# Patient Record
Sex: MALE | Race: WHITE | Employment: OTHER | URBAN - METROPOLITAN AREA
[De-identification: names, ages, dates, MRNs, and addresses within clinical notes are randomized per-mention and may not be internally consistent; named-entity substitution may affect disease eponyms.]

---

## 2017-09-14 ENCOUNTER — OFFICE VISIT (OUTPATIENT)
Dept: CARDIOLOGY CLINIC | Age: 72
End: 2017-09-14

## 2017-09-14 VITALS
WEIGHT: 201 LBS | BODY MASS INDEX: 28.14 KG/M2 | HEIGHT: 71 IN | OXYGEN SATURATION: 92 % | DIASTOLIC BLOOD PRESSURE: 68 MMHG | SYSTOLIC BLOOD PRESSURE: 98 MMHG | HEART RATE: 62 BPM

## 2017-09-14 DIAGNOSIS — E78.2 MIXED HYPERLIPIDEMIA: Chronic | ICD-10-CM

## 2017-09-14 DIAGNOSIS — I25.10 CAD S/P PERCUTANEOUS CORONARY ANGIOPLASTY: ICD-10-CM

## 2017-09-14 DIAGNOSIS — Z86.718 HX OF BLOOD CLOTS: ICD-10-CM

## 2017-09-14 DIAGNOSIS — D68.59 HYPERCOAGULABLE STATE (HCC): ICD-10-CM

## 2017-09-14 DIAGNOSIS — Z98.61 CAD S/P PERCUTANEOUS CORONARY ANGIOPLASTY: ICD-10-CM

## 2017-09-14 DIAGNOSIS — I10 ESSENTIAL HYPERTENSION: Primary | Chronic | ICD-10-CM

## 2017-09-14 PROCEDURE — 99214 OFFICE O/P EST MOD 30 MIN: CPT | Performed by: INTERNAL MEDICINE

## 2017-09-14 RX ORDER — TAMSULOSIN HYDROCHLORIDE 0.4 MG/1
CAPSULE ORAL
COMMUNITY
Start: 2017-08-28

## 2017-09-14 RX ORDER — IMIPRAMINE HCL 25 MG
50 TABLET ORAL NIGHTLY
COMMUNITY
Start: 2017-09-08

## 2018-08-15 LAB
ALBUMIN SERPL-MCNC: 4.1 G/DL
ALP BLD-CCNC: 79 U/L
ALT SERPL-CCNC: 14 U/L
ANION GAP SERPL CALCULATED.3IONS-SCNC: NORMAL MMOL/L
AST SERPL-CCNC: 17 U/L
BILIRUB SERPL-MCNC: 0.8 MG/DL (ref 0.1–1.4)
BUN BLDV-MCNC: 16 MG/DL
CALCIUM SERPL-MCNC: 9.5 MG/DL
CHLORIDE BLD-SCNC: 97 MMOL/L
CHOLESTEROL, TOTAL: 106 MG/DL
CHOLESTEROL/HDL RATIO: NORMAL
CO2: 25 MMOL/L
CREAT SERPL-MCNC: 1.3 MG/DL
GFR CALCULATED: NORMAL
GLUCOSE BLD-MCNC: 93 MG/DL
HDLC SERPL-MCNC: 44 MG/DL (ref 35–70)
INR BLD: 1.9
LDL CHOLESTEROL CALCULATED: 49 MG/DL (ref 0–160)
POTASSIUM SERPL-SCNC: 4.7 MMOL/L
PROTIME: NORMAL SECONDS
SODIUM BLD-SCNC: 136 MMOL/L
TOTAL PROTEIN: 6.5
TRIGL SERPL-MCNC: 66 MG/DL
TSH SERPL DL<=0.05 MIU/L-ACNC: 2.56 UIU/ML
VLDLC SERPL CALC-MCNC: NORMAL MG/DL

## 2018-09-19 ENCOUNTER — OFFICE VISIT (OUTPATIENT)
Dept: CARDIOLOGY CLINIC | Age: 73
End: 2018-09-19

## 2018-09-19 VITALS
SYSTOLIC BLOOD PRESSURE: 106 MMHG | HEIGHT: 71 IN | DIASTOLIC BLOOD PRESSURE: 60 MMHG | HEART RATE: 70 BPM | WEIGHT: 198.6 LBS | BODY MASS INDEX: 27.8 KG/M2

## 2018-09-19 DIAGNOSIS — I10 ESSENTIAL HYPERTENSION: Chronic | ICD-10-CM

## 2018-09-19 DIAGNOSIS — D68.59 HYPERCOAGULABLE STATE (HCC): ICD-10-CM

## 2018-09-19 DIAGNOSIS — Z98.61 CAD S/P PERCUTANEOUS CORONARY ANGIOPLASTY: Primary | ICD-10-CM

## 2018-09-19 DIAGNOSIS — N28.9 RENAL INSUFFICIENCY: ICD-10-CM

## 2018-09-19 DIAGNOSIS — E78.2 MIXED HYPERLIPIDEMIA: Chronic | ICD-10-CM

## 2018-09-19 DIAGNOSIS — I25.10 CAD S/P PERCUTANEOUS CORONARY ANGIOPLASTY: Primary | ICD-10-CM

## 2018-09-19 DIAGNOSIS — I45.2 RBBB (RIGHT BUNDLE BRANCH BLOCK WITH LEFT ANTERIOR FASCICULAR BLOCK): ICD-10-CM

## 2018-09-19 PROCEDURE — 93000 ELECTROCARDIOGRAM COMPLETE: CPT | Performed by: INTERNAL MEDICINE

## 2018-09-19 PROCEDURE — 99215 OFFICE O/P EST HI 40 MIN: CPT | Performed by: INTERNAL MEDICINE

## 2018-09-19 RX ORDER — CITALOPRAM 40 MG/1
TABLET ORAL
COMMUNITY
Start: 2018-08-23

## 2018-09-19 NOTE — PROGRESS NOTES
percutaneous coronary angioplasty  Asymptomatic and doing well. We'll continue aggressive risk modification for secondary prevention. Patient is a offered to have a stress test evaluation as he had not had one since his coronary intervention in 2012 and the guidelines recommended to be done at least once in 5 years particularly he did not have any typical symptoms when he presented. He verbalizes understanding and would like to hold off on it for now and will contact us if he has any symptoms. Hypercoagulable state [facility])  Patient has been on the long-term warfarin therapy and follows up with PCP. Recent INR was 1.9. Primary/secondary prevention is the goal by aggressive risk modification, healthy and therapeutic life style changes for cardiovascular risk reduction. Various goals are discussed and questions answered. counseled to increase oral fluids and have a follow up with PCP for renal insufficiency. Appropriate prescriptions if needed on this visit are addressed. After visit summery is provided. Questions answered and patient verbalizes understanding. Follow up in office in 12 months with ECG, sooner if needed. Viraj Jaramillo MD, 9/19/2018 8:45 AM     Please note this report has been partially produced using speech recognition software and may contain errors related to that system including errors in grammar, punctuation, and spelling, as well as words and phrases that may be inappropriate. If there are any questions or concerns please feel free to contact the dictating provider for clarification.

## 2019-09-18 ENCOUNTER — OFFICE VISIT (OUTPATIENT)
Dept: CARDIOLOGY CLINIC | Age: 74
End: 2019-09-18
Payer: COMMERCIAL

## 2019-09-18 VITALS
HEART RATE: 59 BPM | DIASTOLIC BLOOD PRESSURE: 70 MMHG | SYSTOLIC BLOOD PRESSURE: 120 MMHG | HEIGHT: 71 IN | BODY MASS INDEX: 26.4 KG/M2 | WEIGHT: 188.6 LBS

## 2019-09-18 DIAGNOSIS — I25.10 CAD S/P PERCUTANEOUS CORONARY ANGIOPLASTY: ICD-10-CM

## 2019-09-18 DIAGNOSIS — Z98.61 CAD S/P PERCUTANEOUS CORONARY ANGIOPLASTY: ICD-10-CM

## 2019-09-18 DIAGNOSIS — D68.59 HYPERCOAGULABLE STATE (HCC): Primary | ICD-10-CM

## 2019-09-18 DIAGNOSIS — I10 ESSENTIAL HYPERTENSION: Chronic | ICD-10-CM

## 2019-09-18 DIAGNOSIS — E78.2 MIXED HYPERLIPIDEMIA: Chronic | ICD-10-CM

## 2019-09-18 PROCEDURE — 99214 OFFICE O/P EST MOD 30 MIN: CPT | Performed by: INTERNAL MEDICINE

## 2019-09-18 PROCEDURE — 93000 ELECTROCARDIOGRAM COMPLETE: CPT | Performed by: INTERNAL MEDICINE

## 2019-09-18 RX ORDER — ACETAMINOPHEN 500 MG
500 TABLET ORAL EVERY 6 HOURS PRN
COMMUNITY

## 2020-01-02 PROCEDURE — 99999 PR OFFICE/OUTPT VISIT,PROCEDURE ONLY: CPT | Performed by: INTERNAL MEDICINE

## 2020-01-04 ENCOUNTER — APPOINTMENT (OUTPATIENT)
Dept: CT IMAGING | Age: 75
End: 2020-01-04
Payer: COMMERCIAL

## 2020-01-04 ENCOUNTER — APPOINTMENT (OUTPATIENT)
Dept: GENERAL RADIOLOGY | Age: 75
End: 2020-01-04
Payer: COMMERCIAL

## 2020-01-04 ENCOUNTER — HOSPITAL ENCOUNTER (OUTPATIENT)
Age: 75
Setting detail: OBSERVATION
Discharge: HOME | End: 2020-01-06
Attending: EMERGENCY MEDICINE | Admitting: INTERNAL MEDICINE
Payer: COMMERCIAL

## 2020-01-04 PROBLEM — S32.010A COMPRESSION FRACTURE OF L1 LUMBAR VERTEBRA (HCC): Status: ACTIVE | Noted: 2020-01-04
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
## 2020-01-04 PROBLEM — M54.50 ACUTE BILATERAL LOW BACK PAIN: Status: ACTIVE | Noted: 2020-01-04
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
## 2020-01-04 PROBLEM — I26.99 BILATERAL PULMONARY EMBOLISM (HCC): Status: ACTIVE | Noted: 2020-01-04

## 2020-01-04 LAB
ALBUMIN SERPL-MCNC: 4.2 GM/DL (ref 3.4–5)
ALP BLD-CCNC: 91 IU/L (ref 40–129)
ALT SERPL-CCNC: 13 U/L (ref 10–40)
ANION GAP SERPL CALCULATED.3IONS-SCNC: 13 MMOL/L (ref 4–16)
AST SERPL-CCNC: 18 IU/L (ref 15–37)
BASOPHILS ABSOLUTE: 0.1 K/CU MM
BASOPHILS RELATIVE PERCENT: 1 % (ref 0–1)
BILIRUB SERPL-MCNC: 0.8 MG/DL (ref 0–1)
BUN BLDV-MCNC: 11 MG/DL (ref 6–23)
CALCIUM SERPL-MCNC: 9 MG/DL (ref 8.3–10.6)
CHLORIDE BLD-SCNC: 99 MMOL/L (ref 99–110)
CO2: 25 MMOL/L (ref 21–32)
CREAT SERPL-MCNC: 1.1 MG/DL (ref 0.9–1.3)
DIFFERENTIAL TYPE: ABNORMAL
EOSINOPHILS ABSOLUTE: 0.1 K/CU MM
EOSINOPHILS RELATIVE PERCENT: 1.5 % (ref 0–3)
GFR AFRICAN AMERICAN: >60 ML/MIN/1.73M2
GFR NON-AFRICAN AMERICAN: >60 ML/MIN/1.73M2
GLUCOSE BLD-MCNC: 100 MG/DL (ref 70–99)
HCT VFR BLD CALC: 46.7 % (ref 42–52)
HEMOGLOBIN: 15 GM/DL (ref 13.5–18)
IMMATURE NEUTROPHIL %: 0.2 % (ref 0–0.43)
INR BLD: 2.63 INDEX
LYMPHOCYTES ABSOLUTE: 1.3 K/CU MM
LYMPHOCYTES RELATIVE PERCENT: 16.4 % (ref 24–44)
MCH RBC QN AUTO: 28.5 PG (ref 27–31)
MCHC RBC AUTO-ENTMCNC: 32.1 % (ref 32–36)
MCV RBC AUTO: 88.6 FL (ref 78–100)
MONOCYTES ABSOLUTE: 0.8 K/CU MM
MONOCYTES RELATIVE PERCENT: 9.8 % (ref 0–4)
NUCLEATED RBC %: 0 %
PDW BLD-RTO: 14.6 % (ref 11.7–14.9)
PLATELET # BLD: 241 K/CU MM (ref 140–440)
PMV BLD AUTO: 10 FL (ref 7.5–11.1)
POTASSIUM SERPL-SCNC: 4.6 MMOL/L (ref 3.5–5.1)
PRO-BNP: 3383 PG/ML
PROTHROMBIN TIME: 32.1 SECONDS (ref 11.7–14.5)
RBC # BLD: 5.27 M/CU MM (ref 4.6–6.2)
SEGMENTED NEUTROPHILS ABSOLUTE COUNT: 5.8 K/CU MM
SEGMENTED NEUTROPHILS RELATIVE PERCENT: 71.1 % (ref 36–66)
SODIUM BLD-SCNC: 137 MMOL/L (ref 135–145)
TOTAL IMMATURE NEUTOROPHIL: 0.02 K/CU MM
TOTAL NUCLEATED RBC: 0 K/CU MM
TOTAL PROTEIN: 7.3 GM/DL (ref 6.4–8.2)
TROPONIN T: <0.01 NG/ML
WBC # BLD: 8.2 K/CU MM (ref 4–10.5)

## 2020-01-04 PROCEDURE — 72100 X-RAY EXAM L-S SPINE 2/3 VWS: CPT

## 2020-01-04 PROCEDURE — 6370000000 HC RX 637 (ALT 250 FOR IP): Performed by: EMERGENCY MEDICINE

## 2020-01-04 PROCEDURE — 71275 CT ANGIOGRAPHY CHEST: CPT

## 2020-01-04 PROCEDURE — 6370000000 HC RX 637 (ALT 250 FOR IP): Performed by: NURSE PRACTITIONER

## 2020-01-04 PROCEDURE — 2580000003 HC RX 258: Performed by: NURSE PRACTITIONER

## 2020-01-04 PROCEDURE — 72072 X-RAY EXAM THORAC SPINE 3VWS: CPT

## 2020-01-04 PROCEDURE — 6360000004 HC RX CONTRAST MEDICATION: Performed by: EMERGENCY MEDICINE

## 2020-01-04 PROCEDURE — 1200000000 HC SEMI PRIVATE

## 2020-01-04 PROCEDURE — 85610 PROTHROMBIN TIME: CPT

## 2020-01-04 PROCEDURE — 71045 X-RAY EXAM CHEST 1 VIEW: CPT

## 2020-01-04 PROCEDURE — G0378 HOSPITAL OBSERVATION PER HR: HCPCS

## 2020-01-04 PROCEDURE — 84484 ASSAY OF TROPONIN QUANT: CPT

## 2020-01-04 PROCEDURE — 83880 ASSAY OF NATRIURETIC PEPTIDE: CPT

## 2020-01-04 PROCEDURE — 96372 THER/PROPH/DIAG INJ SC/IM: CPT

## 2020-01-04 PROCEDURE — 36415 COLL VENOUS BLD VENIPUNCTURE: CPT

## 2020-01-04 PROCEDURE — 80053 COMPREHEN METABOLIC PANEL: CPT

## 2020-01-04 PROCEDURE — 85025 COMPLETE CBC W/AUTO DIFF WBC: CPT

## 2020-01-04 PROCEDURE — 96375 TX/PRO/DX INJ NEW DRUG ADDON: CPT

## 2020-01-04 PROCEDURE — 96374 THER/PROPH/DIAG INJ IV PUSH: CPT

## 2020-01-04 PROCEDURE — 2580000003 HC RX 258: Performed by: EMERGENCY MEDICINE

## 2020-01-04 PROCEDURE — 99285 EMERGENCY DEPT VISIT HI MDM: CPT

## 2020-01-04 PROCEDURE — 93005 ELECTROCARDIOGRAM TRACING: CPT | Performed by: PHYSICIAN ASSISTANT

## 2020-01-04 PROCEDURE — 6360000002 HC RX W HCPCS: Performed by: EMERGENCY MEDICINE

## 2020-01-04 RX ORDER — CALCIUM CARBONATE 200(500)MG
750 TABLET,CHEWABLE ORAL DAILY
Status: DISCONTINUED | OUTPATIENT
Start: 2020-01-04 | End: 2020-01-06 | Stop reason: HOSPADM

## 2020-01-04 RX ORDER — NITROGLYCERIN 0.4 MG/1
0.4 TABLET SUBLINGUAL EVERY 5 MIN PRN
Status: DISCONTINUED | OUTPATIENT
Start: 2020-01-04 | End: 2020-01-06 | Stop reason: HOSPADM

## 2020-01-04 RX ORDER — HYDROCODONE BITARTRATE AND ACETAMINOPHEN 5; 325 MG/1; MG/1
1 TABLET ORAL ONCE
Status: COMPLETED | OUTPATIENT
Start: 2020-01-04 | End: 2020-01-04

## 2020-01-04 RX ORDER — SODIUM CHLORIDE 0.9 % (FLUSH) 0.9 %
10 SYRINGE (ML) INJECTION EVERY 12 HOURS SCHEDULED
Status: DISCONTINUED | OUTPATIENT
Start: 2020-01-04 | End: 2020-01-06 | Stop reason: HOSPADM

## 2020-01-04 RX ORDER — SODIUM CHLORIDE 0.9 % (FLUSH) 0.9 %
10 SYRINGE (ML) INJECTION PRN
Status: DISCONTINUED | OUTPATIENT
Start: 2020-01-04 | End: 2020-01-06 | Stop reason: HOSPADM

## 2020-01-04 RX ORDER — LIDOCAINE 4 G/G
1 PATCH TOPICAL DAILY
Status: DISCONTINUED | OUTPATIENT
Start: 2020-01-04 | End: 2020-01-06 | Stop reason: HOSPADM

## 2020-01-04 RX ORDER — CARVEDILOL 12.5 MG/1
12.5 TABLET ORAL 2 TIMES DAILY WITH MEALS
Status: DISCONTINUED | OUTPATIENT
Start: 2020-01-04 | End: 2020-01-06 | Stop reason: HOSPADM

## 2020-01-04 RX ORDER — LEVOTHYROXINE SODIUM 112 UG/1
112 TABLET ORAL DAILY
Status: DISCONTINUED | OUTPATIENT
Start: 2020-01-05 | End: 2020-01-06 | Stop reason: HOSPADM

## 2020-01-04 RX ORDER — ATORVASTATIN CALCIUM 40 MG/1
80 TABLET, FILM COATED ORAL DAILY
Status: DISCONTINUED | OUTPATIENT
Start: 2020-01-04 | End: 2020-01-06 | Stop reason: HOSPADM

## 2020-01-04 RX ORDER — ONDANSETRON 2 MG/ML
4 INJECTION INTRAMUSCULAR; INTRAVENOUS EVERY 6 HOURS PRN
Status: DISCONTINUED | OUTPATIENT
Start: 2020-01-04 | End: 2020-01-06 | Stop reason: HOSPADM

## 2020-01-04 RX ORDER — PANTOPRAZOLE SODIUM 20 MG/1
20 TABLET, DELAYED RELEASE ORAL DAILY
Status: DISCONTINUED | OUTPATIENT
Start: 2020-01-05 | End: 2020-01-06 | Stop reason: HOSPADM

## 2020-01-04 RX ORDER — TRAMADOL HYDROCHLORIDE 50 MG/1
50 TABLET ORAL EVERY 6 HOURS PRN
Status: DISCONTINUED | OUTPATIENT
Start: 2020-01-04 | End: 2020-01-06 | Stop reason: HOSPADM

## 2020-01-04 RX ORDER — ALENDRONATE SODIUM 70 MG/1
70 TABLET ORAL
Status: DISCONTINUED | OUTPATIENT
Start: 2020-01-04 | End: 2020-01-04 | Stop reason: CLARIF

## 2020-01-04 RX ORDER — TAMSULOSIN HYDROCHLORIDE 0.4 MG/1
0.4 CAPSULE ORAL DAILY
Status: DISCONTINUED | OUTPATIENT
Start: 2020-01-04 | End: 2020-01-06 | Stop reason: HOSPADM

## 2020-01-04 RX ORDER — IMIPRAMINE HCL 25 MG
25 TABLET ORAL NIGHTLY
Status: DISCONTINUED | OUTPATIENT
Start: 2020-01-04 | End: 2020-01-06 | Stop reason: HOSPADM

## 2020-01-04 RX ORDER — ACETAMINOPHEN 500 MG
500 TABLET ORAL EVERY 6 HOURS PRN
Status: DISCONTINUED | OUTPATIENT
Start: 2020-01-04 | End: 2020-01-06 | Stop reason: HOSPADM

## 2020-01-04 RX ORDER — CITALOPRAM 40 MG/1
40 TABLET ORAL DAILY
Status: DISCONTINUED | OUTPATIENT
Start: 2020-01-05 | End: 2020-01-06 | Stop reason: HOSPADM

## 2020-01-04 RX ORDER — FENTANYL CITRATE 50 UG/ML
25 INJECTION, SOLUTION INTRAMUSCULAR; INTRAVENOUS ONCE
Status: COMPLETED | OUTPATIENT
Start: 2020-01-04 | End: 2020-01-04

## 2020-01-04 RX ORDER — ASPIRIN 81 MG/1
81 TABLET ORAL DAILY
Status: DISCONTINUED | OUTPATIENT
Start: 2020-01-04 | End: 2020-01-06 | Stop reason: HOSPADM

## 2020-01-04 RX ADMIN — TRAMADOL HYDROCHLORIDE 50 MG: 50 TABLET, FILM COATED ORAL at 22:17

## 2020-01-04 RX ADMIN — ATORVASTATIN CALCIUM 80 MG: 40 TABLET, FILM COATED ORAL at 22:17

## 2020-01-04 RX ADMIN — HYDROCODONE BITARTRATE AND ACETAMINOPHEN 1 TABLET: 5; 325 TABLET ORAL at 15:20

## 2020-01-04 RX ADMIN — ENOXAPARIN SODIUM 80 MG: 80 INJECTION SUBCUTANEOUS at 17:55

## 2020-01-04 RX ADMIN — SODIUM CHLORIDE, PRESERVATIVE FREE 10 ML: 5 INJECTION INTRAVENOUS at 22:18

## 2020-01-04 RX ADMIN — TAMSULOSIN HYDROCHLORIDE 0.4 MG: 0.4 CAPSULE ORAL at 22:17

## 2020-01-04 RX ADMIN — ASPIRIN 81 MG: 81 TABLET, COATED ORAL at 22:17

## 2020-01-04 RX ADMIN — CARVEDILOL 12.5 MG: 12.5 TABLET, FILM COATED ORAL at 22:17

## 2020-01-04 RX ADMIN — FENTANYL CITRATE 25 MCG: 50 INJECTION, SOLUTION INTRAMUSCULAR; INTRAVENOUS at 17:49

## 2020-01-04 RX ADMIN — IOPAMIDOL 75 ML: 755 INJECTION, SOLUTION INTRAVENOUS at 16:39

## 2020-01-04 RX ADMIN — Medication 10 ML: at 16:39

## 2020-01-04 RX ADMIN — CALCIUM CARBONATE 750 MG: 500 TABLET, CHEWABLE ORAL at 22:17

## 2020-01-04 RX ADMIN — IMIPRAMINE HYDROCHLORIDE 25 MG: 25 TABLET ORAL at 23:58

## 2020-01-04 ASSESSMENT — PAIN DESCRIPTION - FREQUENCY
FREQUENCY: CONTINUOUS
FREQUENCY: INTERMITTENT
FREQUENCY: CONTINUOUS
FREQUENCY: CONTINUOUS

## 2020-01-04 ASSESSMENT — PAIN SCALES - GENERAL
PAINLEVEL_OUTOF10: 6
PAINLEVEL_OUTOF10: 7
PAINLEVEL_OUTOF10: 6
PAINLEVEL_OUTOF10: 6
PAINLEVEL_OUTOF10: 8
PAINLEVEL_OUTOF10: 6
PAINLEVEL_OUTOF10: 7

## 2020-01-04 ASSESSMENT — PAIN DESCRIPTION - ORIENTATION
ORIENTATION: POSTERIOR
ORIENTATION: POSTERIOR;UPPER;MID;LOWER
ORIENTATION: UPPER;MID;LOWER

## 2020-01-04 ASSESSMENT — PAIN DESCRIPTION - LOCATION
LOCATION: BACK

## 2020-01-04 ASSESSMENT — PAIN DESCRIPTION - PROGRESSION
CLINICAL_PROGRESSION: NOT CHANGED
CLINICAL_PROGRESSION: NOT CHANGED

## 2020-01-04 ASSESSMENT — PAIN DESCRIPTION - ONSET: ONSET: ON-GOING

## 2020-01-04 ASSESSMENT — PAIN DESCRIPTION - DESCRIPTORS
DESCRIPTORS: SHARP
DESCRIPTORS: SPASM

## 2020-01-04 ASSESSMENT — PAIN DESCRIPTION - PAIN TYPE
TYPE: ACUTE PAIN

## 2020-01-04 NOTE — ED NOTES
Pt reports he has hx of HTN.  Pt /116  Wade Bell, RN  01/04/20 Edwina 132 Edin Lara RN  01/04/20 3725

## 2020-01-04 NOTE — H&P
wife at the time of admission in lay language who agree to the above plan and disposition of admission for further care. All concerns and questions addressed. Patient assessment and plan in conjunction with supervising physician - Dr. Kasie Mauricio Cardiac    DVT Prophylaxis [x] Lovenox, []  Heparin, [] SCDs, [] Ambulation  [] Long term AC   GI Prophylaxis [x] PPI,  [] H2 Blocker,  [] Carafate,  [] Diet,  [] No GI prophylaxis, N/A: patient is not under significant medical stress, non-ICU or is receiving a diet/tube feeds   Code Status  Full CODE   Disposition Patient requires continued admission due to Bilateral pulmonary embolism ([facility] [address] Discharge Plan: Patient plans to return home upon discharge. MDM [] Low, [x] Moderate,[]  High  Patient's risk as above due to:      [x] One or more chronic illnesses with mild exacerbation progression      [] Two or more stable chronic illnesses      [] Undiagnosed new problem with uncertain prognosis      [] Elective major surgery      []Prescription drug management     History of Present Illness:     Principal Problem: Bilateral pulmonary embolism ([facility] [address]  Lupe Chaudhry is a 66 y.o. male who was sent to the ED by Urgent care with wife for complaints of lower back pain and hypoxia. Patient has past medical history of PE on Coumadin, CAD, asthma, arthritis, GERD,, hyperlipidemia, hypertension, and thyroid disease. Patient reports that he was lifting some fence pieces to help his neighbor whose fence blew down earlier in the week and fell 2-3 days ago. Then, since Wednesday he has been having lower back pain. He ignored the pain and figured it was muscle strain. However, pain got worse today, and he went to urgent care for evaluation. But while at the center, he was told that he was hypoxic and sent to emergency hospital. Patient denies chest pain, palpitation, fever, shortness of breath, and cough. Patient reports colonoscopy screening 2 years ago.  Denies blood lateralizing weakness. PSYC  -Awake, alert, oriented x 4. Appropriate affect. Past Medical History:      Past Medical History:   Diagnosis Date    Anemia     Arthritis     CAD S/P percutaneous coronary angioplasty 8/13/2012    Anamalous RCA, Stent in Diagonal    GERD (gastroesophageal reflux disease)     [place] filter in place     H/O cardiac catheterization 8/9/12    H/O echocardiogram     3/13 Mild MR/TR, 7/12 mildly reduced LVS with inferolateral wall hypokinesis, mildly reduced LV EF 35%, mild lt ventricular dilatation, mod lt atrial enlargement, mild mitral regurg, mild tricuspid regurg    H/O exercise stress test 8/28/12    normal study. EF 48%, exercise 4.6 METS    History of complete ECG 8/22/12    Hx of blood clots     lt  arm    Hx of echocardiogram 07/01/2015    EF 45-50% Trace MR and TR, mild pulm. htn.  Hypercoagulable state ([address]     Hyperlipidemia     Hypertension     Pulmonary embolism ([address] 1980    RBBB (right bundle branch block with left anterior fascicular block)     Renal insufficiency 9/19/2018    Creatinine 1.3, 8/2018    Thyroid disease     Venous insufficiency      Past Surgery History:  Patient  has a past surgical history that includes Leg Tendon Surgery; tumor removal; Tonsillectomy; knee surgery; and Coronary angioplasty with stent (8/13/12). Social History:    FAM HX: Assessed: family history includes Cancer in his father; Heart Disease in his brother, maternal uncle, and mother; Stroke in his mother.   Soc HX:   Social History     Socioeconomic History    Marital status:      Spouse name: None    Number of children: None    Years of education: None    Highest education level: None   Occupational History    None   Social Needs    Financial resource strain: None    Food insecurity:     Worry: None     Inability: None    Transportation needs:     Medical: None     Non-medical: None   Tobacco Use    Smoking status: Former Smoker    Smokeless mouth daily  8/14/14   Historical Provider, MD   warfarin (COUMADIN) 5 MG tablet   Take 5 mg by mouth daily Pt takes 1 tablet on Mon., Wed., Friday and 1/2 tablet on other days 8/15/14   Historical Provider, MD   carvedilol (COREG) 12.5 MG tablet Take 12.5 mg by mouth 2 times daily (with meals). Historical Provider, MD   pantoprazole (PROTONIX) 40 MG tablet   Take 20 mg by mouth daily     Historical Provider, MD   aspirin 81 MG EC tablet Take 81 mg by mouth daily. Historical Provider, MD   levothyroxine (SYNTHROID) 112 MCG tablet Take 112 mcg by mouth daily. Historical Provider, MD   nitroGLYCERIN (NITROSTAT) 0.4 MG SL tablet Place 0.4 mg under the tongue every 5 minutes as needed. Historical Provider, MD     Data:     Laboratory this visit:  Reviewed  Recent Labs     01/04/20  1420   WBC 8.2   HGB 15.0   HCT 46.7         Recent Labs     01/04/20  1420      K 4.6   CL 99   CO2 25   BUN 11   CREATININE 1.1     Recent Labs     01/04/20  1420   AST 18   ALT 13   BILITOT 0.8   ALKPHOS 91     Recent Labs     01/04/20  1420   INR 2.63     No results for input(s): CKTOTAL, CKMB, CKMBINDEX in the last 72 hours. Invalid input(s): Vicky Barlow input(s): PRO-BNP    Radiology this visit:  Reviewed. Xr Thoracic Spine (3 Views)    Result Date: 1/4/2020  EXAMINATION: THREE XRAY VIEWS OF THE THORACIC SPINE 1/4/2020 3:42 pm COMPARISON: 07/16/2012 HISTORY: ORDERING SYSTEM PROVIDED HISTORY: trauma TECHNOLOGIST PROVIDED HISTORY: Reason for exam:->trauma Reason for Exam: back pain Acuity: Acute Type of Exam: Initial Additional signs and symptoms: did yard wrok yesterday,pain started today FINDINGS: Osteopenia. Lower thoracic spine and superior lumbar spine compression deformities have progressed compared to the lateral chest radiograph. No definite destructive bony abnormalities.      Osteopenia with progression of compression deformities compared to 2012 in the lower thoracic spine and superior lumbar spine. Although new since 2012 these are of uncertain age. A thoracic and lumbar spine MRI would be helpful for further evaluation     Xr Lumbar Spine (2-3 Views)    Result Date: 1/4/2020  EXAMINATION: THREE XRAY VIEWS OF THE LUMBAR SPINE 1/4/2020 3:42 pm COMPARISON: None. HISTORY: ORDERING SYSTEM PROVIDED HISTORY: pain TECHNOLOGIST PROVIDED HISTORY: Reason for exam:->pain Reason for Exam: back pain Acuity: Acute Type of Exam: Initial Additional signs and symptoms: did yard work yesterday,nki,pain started today FINDINGS: Osteopenia. IVC filter in place. Compression deformities of L2 and L1. Multilevel degenerative disc disease and facet joint arthropathy. Again seen are compression deformities of L1 and L2. A lumbar spine MRI would be helpful to evaluate for acuity     Xr Chest Portable    Result Date: 1/4/2020  EXAMINATION: ONE XRAY VIEW OF THE CHEST 1/4/2020 2:20 pm COMPARISON: July 1, 2015 HISTORY: ORDERING SYSTEM PROVIDED HISTORY: back pain TECHNOLOGIST PROVIDED HISTORY: Reason for exam:->back pain Reason for Exam: CHEST PAIN w/cough; back pain Acuity: Acute Type of Exam: Initial Additional signs and symptoms: pain since thursday FINDINGS: The cardiomediastinal silhouette is mildly enlarged. Bibasilar scarring and subsegmental atelectasis is noted. No evidence of acute infiltrate, pleural effusion, or pneumothorax. 1. Mild cardiomegaly without failure. 2. Bibasilar scarring and subsegmental atelectasis. Cta Pulmonary W Contrast    Result Date: 1/4/2020  EXAMINATION: CTA OF THE CHEST 1/4/2020 4:30 pm TECHNIQUE: CTA of the chest was performed after the administration of intravenous contrast.  Multiplanar reformatted images are provided for review. MIP images are provided for review. Dose modulation, iterative reconstruction, and/or weight based adjustment of the mA/kV was utilized to reduce the radiation dose to as low as reasonably achievable. COMPARISON: None.  HISTORY: ORDERING the lower lobes, right middle lobe, and lingula. Indeterminate 2 cm exophytic right renal lesion which may reflect a proteinaceous or hemorrhagic cyst but is incompletely evaluated on the current exam.  A nonemergent dedicated CT or MRI renal protocol recommended for complete characterization. Critical results were called by Dr. Jayce Cortes MD to Dr. Elizabeth Toledo on 1/4/2020 at 17:24. EKG this visit:   EKG: No available ECG to review during assessment/interview. Please see ED notes.     Current Treatment Team:  Treatment Team: Attending Provider: Lashawn Kent MD; Registered Nurse: ILENE Marin APRN-BC [facility]  1/4/2020 6:34 PM      Electronically signed by YANI Martins CNP on 1/4/2020 at 6:34 PM

## 2020-01-04 NOTE — ED PROVIDER NOTES
Triage Chief Complaint:   Back Pain (onset Wed between shoulder blades was doing heavy lifting so blammed it on that. pain increasing worse since/ sent to urgent care due to low pulse ox)      Torres Martinez:  Rox Gilliland is a 66 y.o. male that presents to the emergency department with a lateral back pain. States he was lifting some fence pieces to help the neighbor whose fence blew down earlier in the week so he ignored the pain and figured it was muscle strain. .  States the pain has gotten progressively worse, so he decided to be evaluated today. .  States it is worse with bending and twisting. Denies going into his chest or into his buttocks or down his legs. Patient states he went to urgent care to be seen and they felt he needed to be seen in the ED. He states that there pulse ox was stating his oxygen was in the low 80s however he states he does not feel short of breath does not have any pain in his chest.  He does not have any leg swelling. He is on Coumadin secondary to a history of blood clots in the past.  His oxygen was found to be 99% in triage. Patient states pain is a 7 out of 10 aching worse with bending and twisting. .    Past Medical History:   Diagnosis Date    Anemia     Arthritis     CAD S/P percutaneous coronary angioplasty 8/13/2012    Anamalous RCA, Stent in Diagonal    GERD (gastroesophageal reflux disease)     [place] filter in place     H/O cardiac catheterization 8/9/12    H/O echocardiogram     3/13 Mild MR/TR, 7/12 mildly reduced LVS with inferolateral wall hypokinesis, mildly reduced LV EF 35%, mild lt ventricular dilatation, mod lt atrial enlargement, mild mitral regurg, mild tricuspid regurg    H/O exercise stress test 8/28/12    normal study. EF 48%, exercise 4.6 METS    History of complete ECG 8/22/12    Hx of blood clots     lt  arm    Hx of echocardiogram 07/01/2015    EF 45-50% Trace MR and TR, mild pulm. htn.      Hypercoagulable state ([address]     Hyperlipidemia     Topics Concern    Not on file   Social History Narrative    Not on file     Current Facility-Administered Medications   Medication Dose Route Frequency Provider Last Rate Last Dose    lidocaine 4 % external patch 1 patch  1 patch Transdermal Daily Abby Perae MD   1 patch at 01/04/20 1518    sodium chloride flush 0.9 % injection 10 mL  10 mL Intravenous PRN Abby Perea MD   10 mL at 01/04/20 1639    fentaNYL (SUBLIMAZE) injection 25 mcg  25 mcg Intravenous Once Abby Perea MD         Current Outpatient Medications   Medication Sig Dispense Refill    acetaminophen (TYLENOL) 500 MG tablet Take 500 mg by mouth every 6 hours as needed for Pain      citalopram (CELEXA) 40 MG tablet       imipramine (TOFRANIL) 25 MG tablet       tamsulosin (FLOMAX) 0.4 MG capsule       alendronate (FOSAMAX) 70 MG tablet Take 70 mg by mouth every 7 days      calcium carbonate (TUMS EX) 750 MG chewable tablet Take 1 tablet by mouth daily      atorvastatin (LIPITOR) 80 MG tablet Take 80 mg by mouth daily       warfarin (COUMADIN) 5 MG tablet   Take 5 mg by mouth daily Pt takes 1 tablet on Mon., Wed., Friday and 1/2 tablet on other days      carvedilol (COREG) 12.5 MG tablet Take 12.5 mg by mouth 2 times daily (with meals).  pantoprazole (PROTONIX) 40 MG tablet   Take 20 mg by mouth daily       aspirin 81 MG EC tablet Take 81 mg by mouth daily.  levothyroxine (SYNTHROID) 112 MCG tablet Take 112 mcg by mouth daily.  nitroGLYCERIN (NITROSTAT) 0.4 MG SL tablet Place 0.4 mg under the tongue every 5 minutes as needed. Allergies   Allergen Reactions    Plavix [Clopidogrel]      Skin rash      Poison Ivy Treatment [Benzyl Alcohol-Camphor-Menthol]      Nursing Notes Reviewed    ROS:  At least 10 systems reviewed and otherwise negative except as in the [address]     Physical Exam:  ED Triage Vitals [01/04/20 1348]   Enc Vitals Group      BP (!) 160/110      Pulse 89      Resp 16      Temp 97.8 °F (36.6 °C)      Temp Source Oral      SpO2 99 %      Weight 185 lb (83.9 kg)      Height 5' 11\" (1.803 m)      Head Circumference       Peak Flow       Pain Score       Pain Loc       Pain Edu? Excl. in [address]? My pulse oximetry interpretation is which is within the normal range    GENERAL APPEARANCE: Awake and alert. Cooperative. No acute distress. HEAD: Normocephalic. Atraumatic. EYES: EOM's grossly intact. Sclera anicteric. ENT: Mucous membranes are moist. Tolerates saliva. No trismus. NECK: Supple. No meningismus. Trachea midline. HEART: RRR. Radial pulses 2+. LUNGS: Respirations unlabored. CTAB  ABDOMEN: Soft. Non-tender. No guarding or rebound. EXTREMITIES: No acute deformities. SKIN: Warm and dry. NEUROLOGICAL: No gross facial drooping. Moves all 4 extremities spontaneously. PSYCHIATRIC: Normal mood.     I have reviewed and interpreted all of the currently available lab results from this visit (if applicable):  Results for orders placed or performed during the hospital encounter of 01/04/20   CBC with Auto Diff   Result Value Ref Range    WBC 8.2 4.0 - 10.5 K/CU MM    RBC 5.27 4.6 - 6.2 M/CU MM    Hemoglobin 15.0 13.5 - 18.0 GM/DL    Hematocrit 46.7 42 - 52 %    MCV 88.6 78 - 100 FL    MCH 28.5 27 - 31 PG    MCHC 32.1 32.0 - 36.0 %    RDW 14.6 11.7 - 14.9 %    Platelets 795 416 - 204 K/CU MM    MPV 10.0 7.5 - 11.1 FL    Differential Type AUTOMATED DIFFERENTIAL     Segs Relative 71.1 (H) 36 - 66 %    Lymphocytes % 16.4 (L) 24 - 44 %    Monocytes % 9.8 (H) 0 - 4 %    Eosinophils % 1.5 0 - 3 %    Basophils % 1.0 0 - 1 %    Segs Absolute 5.8 K/CU MM    Lymphocytes Absolute 1.3 K/CU MM    Monocytes Absolute 0.8 K/CU MM    Eosinophils Absolute 0.1 K/CU MM    Basophils Absolute 0.1 K/CU MM    Nucleated RBC % 0.0 %    Total Nucleated RBC 0.0 K/CU MM    Total Immature Neutrophil 0.02 K/CU MM    Immature Neutrophil % 0.2 0 - 0.43 %   Comprehensive Metabolic Panel   Result Value Ref Range    Sodium 137 135 - 145 MMOL/L    Potassium 4.6 3.5 - 5.1 MMOL/L    Chloride 99 99 - 110 mMol/L    CO2 25 21 - 32 MMOL/L    BUN 11 6 - 23 MG/DL    CREATININE 1.1 0.9 - 1.3 MG/DL    Glucose 100 (H) 70 - 99 MG/DL    Calcium 9.0 8.3 - 10.6 MG/DL    Alb 4.2 3.4 - 5.0 GM/DL    Total Protein 7.3 6.4 - 8.2 GM/DL    Total Bilirubin 0.8 0.0 - 1.0 MG/DL    ALT 13 10 - 40 U/L    AST 18 15 - 37 IU/L    Alkaline Phosphatase 91 40 - 129 IU/L    GFR Non-African American >60 >60 mL/min/1.73m2    GFR African American >60 >60 mL/min/1.73m2    Anion Gap 13 4 - 16   Troponin   Result Value Ref Range    Troponin T <0.010 <0.01 NG/ML   Brain Natriuretic Peptide   Result Value Ref Range    Pro-BNP 3,383 (H) <300 PG/ML   PT - INR   Result Value Ref Range    Protime 32.1 (H) 11.7 - 14.5 SECONDS    INR 2.63 INDEX        Radiographs:  [] Radiologist's Wet Read Report Reviewed:      XR CHEST PORTABLE (Preliminary result)   Result time 01/04/20 14:45:37   Preliminary result by Arminda Cotter MD (01/04/20 14:45:37)                Impression:    1. Mild cardiomegaly without failure. 2. Bibasilar scarring and subsegmental atelectasis. Narrative:    EXAMINATION:  ONE XRAY VIEW OF THE CHEST    1/4/2020 2:20 pm    COMPARISON:  July 1, 2015    HISTORY:  ORDERING SYSTEM PROVIDED HISTORY: back pain  TECHNOLOGIST PROVIDED HISTORY:  Reason for exam:->back pain  Reason for Exam: CHEST PAIN w/cough; back pain  Acuity: Acute  Type of Exam: Initial  Additional signs and symptoms: pain since thursday    FINDINGS:  The cardiomediastinal silhouette is mildly enlarged. Bibasilar scarring and subsegmental atelectasis is noted. No evidence of acute infiltrate, pleural effusion, or pneumothorax.                Preliminary result by Arminda Cotter MD (01/04/20 14:33:49)                Impression:    1. Mild cardiomegaly without failure  2.  Bibasilar scarring and subsegmental atelectasis                 CTA PULMONARY W CONTRAST (Final result)   Result time is  normal in caliber. Mediastinum: No evidence of mediastinal lymphadenopathy.  The heart and  pericardium demonstrate no acute abnormality.  There is no acute abnormality  of the thoracic aorta.  Coronary artery atherosclerotic disease.  Moderate  calcified plaque throughout the thoracic aorta. Lungs/pleura: The lungs demonstrate bibasilar atelectasis versus scarring. Scarring versus atelectasis also noted within the right middle lobe and  lingula.  No focal consolidation or pulmonary edema.  No evidence of pleural  effusion or pneumothorax. Upper Abdomen: Limited images of the upper abdomen demonstrate a mildly  complex exophytic right renal lesion measuring 2 cm which is indeterminate on  the current exam.  A nonemergent dedicated CT or MRI renal protocol  recommended for complete characterization.  Simple cyst within the left  kidney.  No additional follow-up necessary. Soft Tissues/Bones: No acute bone or soft tissue abnormality.  Multilevel  chronic compression deformities of the thoracic spine with no definite acute  fracture identified.                    XR LUMBAR SPINE (2-3 VIEWS) (Final result)   Result time 01/04/20 16:24:33   Procedure changed from XR LUMBAR SPINE (MIN 4 VIEWS)   Final result by Otilio Rodriguez MD (01/04/20 16:24:33)                Impression:    Again seen are compression deformities of L1 and L2.  A lumbar spine MRI  would be helpful to evaluate for acuity            Narrative:    EXAMINATION:  THREE XRAY VIEWS OF THE LUMBAR SPINE    1/4/2020 3:42 pm    COMPARISON:  None. HISTORY:  ORDERING SYSTEM PROVIDED HISTORY: pain  TECHNOLOGIST PROVIDED HISTORY:  Reason for exam:->pain  Reason for Exam: back pain  Acuity: Acute  Type of Exam: Initial  Additional signs and symptoms: did yard work yesterday,nki,pain started today    FINDINGS:  Osteopenia. [place] filter in place.  Compression deformities of L2 and L1.   Multilevel degenerative disc disease and facet joint emboli including bilateral lower lobes, right lower lobe, right middle lobe. No heart strain. Also has what appears to be compression fractures at L1 and L2. Also has a right renal lesion. Discussed with patient. He states pain is improved when laying flat but with movement still having worsening pain. Did order fentanyl IV. Will admit him for further eval and treatment. He is agreeable to this plan. Clinical Impression:  1. Acute bilateral low back pain without sciatica    2. Multiple subsegmental pulmonary emboli without acute cor pulmonale    3. Compression fracture of L1 vertebra, initial encounter ([facility]    4. Compression fracture of L2 vertebra, initial encounter ([facility] [address]        Disposition Vitals:  [unfilled], [unfilled], [unfilled], [unfilled]    Disposition referral (if applicable):  No follow-up provider specified.     Disposition medications (if applicable):  New Prescriptions    No medications on file         (Please note that portions of this note may have been completed with a voice recognition program. Efforts were made to edit the dictations but occasionally words are mis-transcribed.)    Sven Kanner, MD Sven Kanner, MD  01/04/20 Sophia Marie MD  01/04/20 2191

## 2020-01-05 LAB
ANION GAP SERPL CALCULATED.3IONS-SCNC: 11 MMOL/L (ref 4–16)
BASOPHILS ABSOLUTE: 0.1 K/CU MM
BASOPHILS RELATIVE PERCENT: 1 % (ref 0–1)
BUN BLDV-MCNC: 16 MG/DL (ref 6–23)
CALCIUM SERPL-MCNC: 9.7 MG/DL (ref 8.3–10.6)
CHLORIDE BLD-SCNC: 93 MMOL/L (ref 99–110)
CO2: 26 MMOL/L (ref 21–32)
CREAT SERPL-MCNC: 1.2 MG/DL (ref 0.9–1.3)
DIFFERENTIAL TYPE: ABNORMAL
EOSINOPHILS ABSOLUTE: 0.2 K/CU MM
EOSINOPHILS RELATIVE PERCENT: 2.4 % (ref 0–3)
GFR AFRICAN AMERICAN: >60 ML/MIN/1.73M2
GFR NON-AFRICAN AMERICAN: 59 ML/MIN/1.73M2
GLUCOSE BLD-MCNC: 96 MG/DL (ref 70–99)
HCT VFR BLD CALC: 45.9 % (ref 42–52)
HEMOGLOBIN: 14.6 GM/DL (ref 13.5–18)
IMMATURE NEUTROPHIL %: 0.3 % (ref 0–0.43)
LYMPHOCYTES ABSOLUTE: 1.4 K/CU MM
LYMPHOCYTES RELATIVE PERCENT: 18.3 % (ref 24–44)
MCH RBC QN AUTO: 28.5 PG (ref 27–31)
MCHC RBC AUTO-ENTMCNC: 31.8 % (ref 32–36)
MCV RBC AUTO: 89.6 FL (ref 78–100)
MONOCYTES ABSOLUTE: 0.7 K/CU MM
MONOCYTES RELATIVE PERCENT: 9.4 % (ref 0–4)
NUCLEATED RBC %: 0 %
PDW BLD-RTO: 14.6 % (ref 11.7–14.9)
PLATELET # BLD: 243 K/CU MM (ref 140–440)
PMV BLD AUTO: 10.3 FL (ref 7.5–11.1)
POTASSIUM SERPL-SCNC: 3.9 MMOL/L (ref 3.5–5.1)
RBC # BLD: 5.12 M/CU MM (ref 4.6–6.2)
SEGMENTED NEUTROPHILS ABSOLUTE COUNT: 5.4 K/CU MM
SEGMENTED NEUTROPHILS RELATIVE PERCENT: 68.6 % (ref 36–66)
SODIUM BLD-SCNC: 130 MMOL/L (ref 135–145)
TOTAL IMMATURE NEUTOROPHIL: 0.02 K/CU MM
TOTAL NUCLEATED RBC: 0 K/CU MM
WBC # BLD: 7.9 K/CU MM (ref 4–10.5)

## 2020-01-05 PROCEDURE — 6370000000 HC RX 637 (ALT 250 FOR IP): Performed by: NURSE PRACTITIONER

## 2020-01-05 PROCEDURE — 6370000000 HC RX 637 (ALT 250 FOR IP): Performed by: EMERGENCY MEDICINE

## 2020-01-05 PROCEDURE — 6360000002 HC RX W HCPCS: Performed by: NURSE PRACTITIONER

## 2020-01-05 PROCEDURE — 93010 ELECTROCARDIOGRAM REPORT: CPT | Performed by: INTERNAL MEDICINE

## 2020-01-05 PROCEDURE — 2580000003 HC RX 258: Performed by: NURSE PRACTITIONER

## 2020-01-05 PROCEDURE — G0378 HOSPITAL OBSERVATION PER HR: HCPCS

## 2020-01-05 PROCEDURE — 80048 BASIC METABOLIC PNL TOTAL CA: CPT

## 2020-01-05 PROCEDURE — 1200000000 HC SEMI PRIVATE

## 2020-01-05 PROCEDURE — 85025 COMPLETE CBC W/AUTO DIFF WBC: CPT

## 2020-01-05 PROCEDURE — 36415 COLL VENOUS BLD VENIPUNCTURE: CPT

## 2020-01-05 PROCEDURE — 96372 THER/PROPH/DIAG INJ SC/IM: CPT

## 2020-01-05 PROCEDURE — 96365 THER/PROPH/DIAG IV INF INIT: CPT

## 2020-01-05 PROCEDURE — 96375 TX/PRO/DX INJ NEW DRUG ADDON: CPT

## 2020-01-05 RX ORDER — MORPHINE SULFATE 4 MG/ML
4 INJECTION, SOLUTION INTRAMUSCULAR; INTRAVENOUS ONCE
Status: COMPLETED | OUTPATIENT
Start: 2020-01-05 | End: 2020-01-06

## 2020-01-05 RX ORDER — KETOROLAC TROMETHAMINE 30 MG/ML
15 INJECTION, SOLUTION INTRAMUSCULAR; INTRAVENOUS ONCE
Status: COMPLETED | OUTPATIENT
Start: 2020-01-05 | End: 2020-01-05

## 2020-01-05 RX ADMIN — ASPIRIN 81 MG: 81 TABLET, COATED ORAL at 09:30

## 2020-01-05 RX ADMIN — HYDRALAZINE HYDROCHLORIDE 10 MG: 20 INJECTION INTRAMUSCULAR; INTRAVENOUS at 23:43

## 2020-01-05 RX ADMIN — CARVEDILOL 12.5 MG: 12.5 TABLET, FILM COATED ORAL at 09:30

## 2020-01-05 RX ADMIN — CALCIUM CARBONATE 750 MG: 500 TABLET, CHEWABLE ORAL at 09:31

## 2020-01-05 RX ADMIN — LEVOTHYROXINE SODIUM 112 MCG: 112 TABLET ORAL at 05:09

## 2020-01-05 RX ADMIN — ENOXAPARIN SODIUM 80 MG: 80 INJECTION SUBCUTANEOUS at 22:15

## 2020-01-05 RX ADMIN — SODIUM CHLORIDE, PRESERVATIVE FREE 10 ML: 5 INJECTION INTRAVENOUS at 09:31

## 2020-01-05 RX ADMIN — KETOROLAC TROMETHAMINE 15 MG: 30 INJECTION, SOLUTION INTRAMUSCULAR; INTRAVENOUS at 05:54

## 2020-01-05 RX ADMIN — CITALOPRAM HYDROBROMIDE 40 MG: 40 TABLET ORAL at 09:30

## 2020-01-05 RX ADMIN — ENOXAPARIN SODIUM 80 MG: 80 INJECTION SUBCUTANEOUS at 09:30

## 2020-01-05 RX ADMIN — TRAMADOL HYDROCHLORIDE 50 MG: 50 TABLET, FILM COATED ORAL at 23:31

## 2020-01-05 RX ADMIN — IMIPRAMINE HYDROCHLORIDE 25 MG: 25 TABLET ORAL at 23:31

## 2020-01-05 RX ADMIN — PANTOPRAZOLE SODIUM 20 MG: 20 TABLET, DELAYED RELEASE ORAL at 05:09

## 2020-01-05 RX ADMIN — TRAMADOL HYDROCHLORIDE 50 MG: 50 TABLET, FILM COATED ORAL at 18:04

## 2020-01-05 RX ADMIN — TAMSULOSIN HYDROCHLORIDE 0.4 MG: 0.4 CAPSULE ORAL at 09:30

## 2020-01-05 RX ADMIN — CARVEDILOL 12.5 MG: 12.5 TABLET, FILM COATED ORAL at 18:05

## 2020-01-05 RX ADMIN — TRAMADOL HYDROCHLORIDE 50 MG: 50 TABLET, FILM COATED ORAL at 05:06

## 2020-01-05 RX ADMIN — SODIUM CHLORIDE, PRESERVATIVE FREE 10 ML: 5 INJECTION INTRAVENOUS at 22:15

## 2020-01-05 RX ADMIN — ATORVASTATIN CALCIUM 80 MG: 40 TABLET, FILM COATED ORAL at 22:15

## 2020-01-05 ASSESSMENT — PAIN SCALES - GENERAL
PAINLEVEL_OUTOF10: 7
PAINLEVEL_OUTOF10: 8
PAINLEVEL_OUTOF10: 8
PAINLEVEL_OUTOF10: 9
PAINLEVEL_OUTOF10: 9
PAINLEVEL_OUTOF10: 8
PAINLEVEL_OUTOF10: 9
PAINLEVEL_OUTOF10: 8

## 2020-01-05 ASSESSMENT — PAIN DESCRIPTION - PAIN TYPE
TYPE: ACUTE PAIN
TYPE: ACUTE PAIN

## 2020-01-05 ASSESSMENT — PAIN DESCRIPTION - FREQUENCY: FREQUENCY: CONTINUOUS

## 2020-01-05 ASSESSMENT — PAIN DESCRIPTION - ORIENTATION: ORIENTATION: MID;LOWER

## 2020-01-05 ASSESSMENT — PAIN DESCRIPTION - LOCATION
LOCATION: BACK
LOCATION: BACK

## 2020-01-05 ASSESSMENT — PAIN DESCRIPTION - DESCRIPTORS: DESCRIPTORS: CONSTANT;SHARP

## 2020-01-05 ASSESSMENT — PAIN DESCRIPTION - PROGRESSION: CLINICAL_PROGRESSION: GRADUALLY WORSENING

## 2020-01-05 ASSESSMENT — PAIN DESCRIPTION - ONSET: ONSET: ON-GOING

## 2020-01-05 NOTE — PROGRESS NOTES
2 person skin assessment done with this nurse and Greta Maria RN. Assessment unremarkable, no issues noted.

## 2020-01-05 NOTE — PROGRESS NOTES
Hospitalist Progress Note      Name:  Daniela Munguia /Age/Sex: 1945  (66 y.o. male)   MRN & CSN:  [id number] & [id number] Admission Date/Time: 2020  2:13 PM   Location:  [facility]- PCP: Brutus Siemens, MD         Hospital Day: 2    Assessment and Plan:   Daniela Munguia is a 66 y.o.  male  who presents with Bilateral pulmonary embolism ([facility] [address]    1. Pulmonary Embolism: Stable. · CT Chest with multiple small filling defect bilateral lower lobes and right upper love, right middle lobe. · Has history of Protein C Deficiency, up to date with preventive screening including colonoscopy and PSA  · Father had prostate cancer  · Taking Coumadin with therapeutic INR 8 weeks ago  · Started on Lovenox, may need to be switched to NOAC  2. Low Back Pain, Acute: due to fall. · Compression fracture L1-L2  · No LE edema, urinary or bowel incontinence  · Pain control  3. Hypertension: BP   4. Asthma not in exacerbation  5. CAD: continue ASA and Lipitor  6. GERD: continue PPI  7. Hyperlipidemia: continue statin  8. Hypothyroidism     Diet DIET CARDIAC;   DVT Prophylaxis [x] Lovenox, []  Heparin, [] SCDs, [] Warfarin  [] NOAC     GI Prophylaxis [x] PPI,  [] H2 Blocker,  [] Carafate,  [] Diet/Tube Feeds   Code Status Full Code   MDM [] Low, [x] Moderate,[]  High     History of Present Illness:     Chief Complaint: Bilateral pulmonary embolism ([address]    Seen and examined. Denied SOB, hemoptysis, leg pain. History of Protein C Deficiency. He was diagnosed with post operative pulmonary embolism in the 80's and was given Coumadin for 8 weeks ? Had recurrence of DVT/PE in 's. Was diagnosed with Protein C deficiency. IVC filter placed, was never removed.      Ten point ROS reviewed negative, unless as noted above    Objective:   No intake or output data in the 24 hours ending 20 1136   Vitals:   Vitals:    20 1002   BP: (!) 148/96   Pulse: 72   Resp: 15   Temp:    SpO2:      Physical Exam: 11 16   CREATININE 1.1 1.2     Recent Labs     01/04/20  1420   AST 18   ALT 13   BILITOT 0.8   ALKPHOS 91     Recent Labs     01/04/20  1420   INR 2.63     Recent Labs     01/04/20  1420   TROPONINT <0.010      Imaging reviewed    Electronically signed by Emilie Gaston MD on 1/5/2020 at 11:36 AM

## 2020-01-06 ENCOUNTER — APPOINTMENT (OUTPATIENT)
Dept: ULTRASOUND IMAGING | Age: 75
End: 2020-01-06
Payer: COMMERCIAL

## 2020-01-06 VITALS
OXYGEN SATURATION: 98 % | BODY MASS INDEX: 25.9 KG/M2 | DIASTOLIC BLOOD PRESSURE: 85 MMHG | SYSTOLIC BLOOD PRESSURE: 132 MMHG | HEART RATE: 75 BPM | HEIGHT: 71 IN | TEMPERATURE: 97.8 F | RESPIRATION RATE: 15 BRPM | WEIGHT: 185 LBS

## 2020-01-06 PROCEDURE — 6360000002 HC RX W HCPCS: Performed by: NURSE PRACTITIONER

## 2020-01-06 PROCEDURE — G0378 HOSPITAL OBSERVATION PER HR: HCPCS

## 2020-01-06 PROCEDURE — 2580000003 HC RX 258: Performed by: NURSE PRACTITIONER

## 2020-01-06 PROCEDURE — 94761 N-INVAS EAR/PLS OXIMETRY MLT: CPT

## 2020-01-06 PROCEDURE — 6370000000 HC RX 637 (ALT 250 FOR IP): Performed by: NURSE PRACTITIONER

## 2020-01-06 PROCEDURE — 96372 THER/PROPH/DIAG INJ SC/IM: CPT

## 2020-01-06 PROCEDURE — 99221 1ST HOSP IP/OBS SF/LOW 40: CPT | Performed by: INTERNAL MEDICINE

## 2020-01-06 PROCEDURE — 6370000000 HC RX 637 (ALT 250 FOR IP): Performed by: INTERNAL MEDICINE

## 2020-01-06 PROCEDURE — 93970 EXTREMITY STUDY: CPT

## 2020-01-06 PROCEDURE — 6370000000 HC RX 637 (ALT 250 FOR IP): Performed by: EMERGENCY MEDICINE

## 2020-01-06 PROCEDURE — 96375 TX/PRO/DX INJ NEW DRUG ADDON: CPT

## 2020-01-06 RX ORDER — LIDOCAINE 4 G/G
1 PATCH TOPICAL DAILY
Qty: 7 PATCH | Refills: 0 | Status: SHIPPED | OUTPATIENT
Start: 2020-01-07 | End: 2020-01-14

## 2020-01-06 RX ORDER — AMLODIPINE BESYLATE 5 MG/1
5 TABLET ORAL DAILY
Status: DISCONTINUED | OUTPATIENT
Start: 2020-01-06 | End: 2020-01-06 | Stop reason: HOSPADM

## 2020-01-06 RX ORDER — TRAMADOL HYDROCHLORIDE 50 MG/1
50 TABLET ORAL EVERY 6 HOURS PRN
Qty: 12 TABLET | Refills: 0 | Status: SHIPPED | OUTPATIENT
Start: 2020-01-06 | End: 2020-01-09

## 2020-01-06 RX ORDER — TRAMADOL HYDROCHLORIDE 50 MG/1
50 TABLET ORAL EVERY 6 HOURS PRN
Qty: 12 TABLET | Refills: 0 | Status: SHIPPED | OUTPATIENT
Start: 2020-01-06 | End: 2020-01-06

## 2020-01-06 RX ORDER — AMLODIPINE BESYLATE 5 MG/1
5 TABLET ORAL DAILY
Qty: 30 TABLET | Refills: 3 | Status: SHIPPED | OUTPATIENT
Start: 2020-01-07 | End: 2020-09-16

## 2020-01-06 RX ADMIN — AMLODIPINE BESYLATE 5 MG: 5 TABLET ORAL at 08:22

## 2020-01-06 RX ADMIN — CITALOPRAM HYDROBROMIDE 40 MG: 40 TABLET ORAL at 08:22

## 2020-01-06 RX ADMIN — CALCIUM CARBONATE 750 MG: 500 TABLET, CHEWABLE ORAL at 08:21

## 2020-01-06 RX ADMIN — MORPHINE SULFATE 4 MG: 4 INJECTION, SOLUTION INTRAMUSCULAR; INTRAVENOUS at 00:10

## 2020-01-06 RX ADMIN — ENOXAPARIN SODIUM 80 MG: 80 INJECTION SUBCUTANEOUS at 08:21

## 2020-01-06 RX ADMIN — SODIUM CHLORIDE, PRESERVATIVE FREE 10 ML: 5 INJECTION INTRAVENOUS at 08:21

## 2020-01-06 RX ADMIN — CARVEDILOL 12.5 MG: 12.5 TABLET, FILM COATED ORAL at 08:22

## 2020-01-06 RX ADMIN — ASPIRIN 81 MG: 81 TABLET, COATED ORAL at 08:22

## 2020-01-06 RX ADMIN — LEVOTHYROXINE SODIUM 112 MCG: 112 TABLET ORAL at 06:02

## 2020-01-06 RX ADMIN — PANTOPRAZOLE SODIUM 20 MG: 20 TABLET, DELAYED RELEASE ORAL at 06:02

## 2020-01-06 RX ADMIN — TAMSULOSIN HYDROCHLORIDE 0.4 MG: 0.4 CAPSULE ORAL at 08:22

## 2020-01-06 RX ADMIN — CARVEDILOL 12.5 MG: 12.5 TABLET, FILM COATED ORAL at 16:43

## 2020-01-06 ASSESSMENT — PAIN SCALES - GENERAL
PAINLEVEL_OUTOF10: 0
PAINLEVEL_OUTOF10: 7
PAINLEVEL_OUTOF10: 9

## 2020-01-06 ASSESSMENT — PAIN DESCRIPTION - ORIENTATION: ORIENTATION: MID;LOWER

## 2020-01-06 ASSESSMENT — PAIN DESCRIPTION - LOCATION: LOCATION: BACK

## 2020-01-06 ASSESSMENT — PAIN DESCRIPTION - PAIN TYPE: TYPE: ACUTE PAIN

## 2020-01-06 NOTE — PLAN OF CARE
Problem: Pain:  Goal: Pain level will decrease  Description  Pain level will decrease  1/6/2020 0042 by Joaquim Vergara RN  Outcome: Ongoing  1/5/2020 1609 by Kelsi Salazar RN  Outcome: Ongoing  Goal: Control of acute pain  Description  Control of acute pain  1/6/2020 0042 by Joaquim Vergara RN  Outcome: Ongoing  1/5/2020 1609 by Kelsi Salazar RN  Outcome: Ongoing  Goal: Control of chronic pain  Description  Control of chronic pain  1/5/2020 1609 by Kelsi Salazar RN  Outcome: Ongoing     Problem: Falls - Risk of:  Goal: Will remain free from falls  Description  Will remain free from falls  1/5/2020 1609 by Kelsi Salazar RN  Outcome: Ongoing  Goal: Absence of physical injury  Description  Absence of physical injury  1/5/2020 1609 by Kelsi Salazar RN  Outcome: Ongoing

## 2020-01-06 NOTE — CARE COORDINATION
.CM met with pt for d/c planning. Introduced self and updated white board. Pt lives with spouse and is independent with ADL's. Pt drives, has a PCP, has insurance, and is able to afford medication. Pt denies having any DME or services. [facility] offered and pt refused. Pt denies any needs at this time. D/c plan is home with spouse, Eliquis coupons provided. No other needs. CM will continue to follow.  TE

## 2020-01-06 NOTE — DISCHARGE SUMMARY
Discharge Summary    Name:  Shannan Hahn /Age/Sex: 1945  (66 y.o. male)   MRN & CSN:  [id number] & [id number] Admission Date/Time: 2020  2:13 PM   Attending:  Lucio Boogie MD Discharging Physician: Lucio Boogie MD     Hospital Course:   Shannan Hahn is a 66 y.o.  male  who presents with Bilateral pulmonary embolism ([address]    1. Pulmonary Embolism: Stable. · CT Chest with multiple small filling defect bilateral lower lobes and right upper love, right middle lobe. · Has history of Protein C Deficiency, up to date with preventive screening including colonoscopy and PSA  · Father had prostate cancer  · Taking Coumadin with therapeutic INR 8 weeks ago  · Started on Lovenox, may need to be switched to NOAC  2. Low Back Pain, Acute: due to fall. · Compression fracture L1-L2  · No LE edema, urinary or bowel incontinence  · Pain control  3. Hypertension: BP   4. Asthma not in exacerbation  5. CAD: continue ASA and Lipitor  6. GERD: continue PPI  7. Hyperlipidemia: continue statin  8. Hypothyroidism     The patient expressed appropriate understanding of and agreement with the discharge recommendations, medications, and plan.      Consults this admission:  IP CONSULT TO HOSPITALIST  [facility]    Discharge Instruction:   Follow up appointments: Hematology  Primary care physician:  within 2 weeks    Diet:  regular diet   Activity: activity as tolerated  Disposition: Discharged to:   [x]Home, [][facility], []SNF, []Acute Rehab, []Hospice   Condition on discharge: Stable    Discharge Medications:      Herve Grover   Home Medication Instructions MXA:[id number]    Printed on:20 1091   Medication Information                      acetaminophen (TYLENOL) 500 MG tablet  Take 500 mg by mouth every 6 hours as needed for Pain             alendronate (FOSAMAX) 70 MG tablet  Take 70 mg by mouth every 7 days             amLODIPine (NORVASC) 5 MG tablet  Take 1 tablet by mouth daily apixaban (ELIQUIS STARTER PACK) 5 MG TABS tablet  Take 10 mg (2 tablets) orally twice daily for 7 days, then take 5 mg (1 tablet) orally twice daily thereafter. aspirin 81 MG EC tablet  Take 81 mg by mouth daily. atorvastatin (LIPITOR) 80 MG tablet  Take 80 mg by mouth daily              calcium carbonate (TUMS EX) 750 MG chewable tablet  Take 1 tablet by mouth 2 times daily              carvedilol (COREG) 12.5 MG tablet  Take 12.5 mg by mouth 2 times daily (with meals). citalopram (CELEXA) 40 MG tablet               imipramine (TOFRANIL) 25 MG tablet  50 mg nightly              levothyroxine (SYNTHROID) 112 MCG tablet  Take 112 mcg by mouth daily. lidocaine 4 % external patch  Place 1 patch onto the skin daily for 7 days             nitroGLYCERIN (NITROSTAT) 0.4 MG SL tablet  Place 0.4 mg under the tongue every 5 minutes as needed. pantoprazole (PROTONIX) 40 MG tablet    Take 20 mg by mouth daily              psyllium (KONSYL) 28.3 % PACK  Take 1 packet by mouth daily             tamsulosin (FLOMAX) 0.4 MG capsule               traMADol (ULTRAM) 50 MG tablet  Take 1 tablet by mouth every 6 hours as needed for Pain for up to 3 days. Objective Findings at Discharge:   /89   Pulse 76   Temp 97.6 °F (36.4 °C) (Oral)   Resp 16   Ht 5' 11\" (1.803 m)   Wt 185 lb (83.9 kg)   SpO2 98%   BMI 25.80 kg/m²            PHYSICAL EXAM   GEN    Awake male, sitting upright in bed in no apparent distress. Appears given age. EYES   Pupils are equally round. No scleral erythema, discharge, or conjunctivitis. HENT  Mucous membranes are moist. Oral pharynx without exudates, no evidence of thrush. NECK  Supple, no apparent thyromegaly or masses. RESP  Clear to auscultation, no wheezes, rales or rhonchi. Symmetric chest movement while on room air. CARDIO/VASC           S1/S2 auscultated.  Regular rate without appreciable murmurs, rubs, right or left lower extremity. 2. Bilateral popliteal cysts. Cta Pulmonary W Contrast    Result Date: 1/4/2020  EXAMINATION: CTA OF THE CHEST 1/4/2020 4:30 pm TECHNIQUE: CTA of the chest was performed after the administration of intravenous contrast.  Multiplanar reformatted images are provided for review. MIP images are provided for review. Dose modulation, iterative reconstruction, and/or weight based adjustment of the mA/kV was utilized to reduce the radiation dose to as low as reasonably achievable. COMPARISON: None. HISTORY: ORDERING SYSTEM PROVIDED HISTORY: chest pain, ? PE TECHNOLOGIST PROVIDED HISTORY: PE protocol please. Reason for exam:->chest pain, ? PE Reason for Exam: chest pain, ? PE Acuity: Acute Type of Exam: Initial Relevant Medical/Surgical History: 75 ML ISOVUE 370 USED FINDINGS: Pulmonary Arteries: The pulmonary arteries are adequately opacified for evaluation. Small filling defects identified at the segmental branches of the bilateral lower lobes and right upper lobe as well as the right middle lobe most compatible with small pulmonary emboli. Main pulmonary artery is normal in caliber. Mediastinum: No evidence of mediastinal lymphadenopathy. The heart and pericardium demonstrate no acute abnormality. There is no acute abnormality of the thoracic aorta. Coronary artery atherosclerotic disease. Moderate calcified plaque throughout the thoracic aorta. Lungs/pleura: The lungs demonstrate bibasilar atelectasis versus scarring. Scarring versus atelectasis also noted within the right middle lobe and lingula. No focal consolidation or pulmonary edema. No evidence of pleural effusion or pneumothorax. Upper Abdomen: Limited images of the upper abdomen demonstrate a mildly complex exophytic right renal lesion measuring 2 cm which is indeterminate on the current exam.  A nonemergent dedicated CT or MRI renal protocol recommended for complete characterization.   Simple cyst within the left kidney. No additional follow-up necessary. Soft Tissues/Bones: No acute bone or soft tissue abnormality. Multilevel chronic compression deformities of the thoracic spine with no definite acute fracture identified. Multiple small filling defect identified within the segmental branches of the bilateral lower lobes and right upper lobe as well as the right middle lobe which are most compatible with small pulmonary emboli. No CT evidence for right heart strain. No large pulmonary embolus identified. Bilateral atelectasis versus scarring within the lower lobes, right middle lobe, and lingula. Indeterminate 2 cm exophytic right renal lesion which may reflect a proteinaceous or hemorrhagic cyst but is incompletely evaluated on the current exam.  A nonemergent dedicated CT or MRI renal protocol recommended for complete characterization. Critical results were called by Dr. Tom Randall MD to Dr. Ezekiel Jenkins on 1/4/2020 at 17:24.        Discharge Time of 20 minutes    Electronically signed by Gala Carlson MD on 1/6/2020 at 1:57 PM

## 2020-01-06 NOTE — CONSULTS
Hematology Oncology Consult note    2020  7:53 AM    Patient:    Yan Garcia  : 1945   66 y.o. MRN: [id number]  Admitted: 2020  2:13 PM ATT: Hortensia Canela MD   3103/3103-A  AdmitDx: Bilateral pulmonary embolism ([address] [I26.99]  Acute bilateral low back pain without sciatica [M54.5]  Multiple subsegmental pulmonary emboli without acute cor pulmonale [I26.94]  Compression fracture of L1 vertebra, initial encounter ([address] [S32.010A]  Compression fracture of L2 vertebra, initial encounter ([address] [S32.020A]  PCP: Adrian Parham MD    Reason for Consult:H/O protein C def on coumadin, Now with bilateral PE    Requesting Physician:  Hortensia Canela MD      History Obtained From:  Patient and review of all records    HISTORY OF PRESENT ILLNESS:    Very pleasant male reported that he was diagnosed with Post OP DVT in  when he was on coumadin for a short period of time but developed unprovoked LE DVT in  when he was found to have protein C def. Since been on coumadin. His INR was therapeutic 8 weeks ago. Reported  Back pain which started after he lifted fence. But later developed sob. Denied any chest pain, fever, cough, hemoptysis. Palpitations or any dizziness. No dysphagia. No bleeding. No abdominal pain, nausea, emesis, diarrhea or any constipation. No rash. Intentional weight loss of about 15-20 lbs. No long ride, injury, immobilization or any steroid use. No LE edema.      20: CTA chest:  Multiple small filling defect identified within the segmental branches of the   bilateral lower lobes and right upper lobe as well as the right middle lobe   which are most compatible with small pulmonary emboli.  No CT evidence for   right heart strain.  No large pulmonary embolus identified.       Bilateral atelectasis versus scarring within the lower lobes, right middle   lobe, and lingula.       Indeterminate 2 cm exophytic right renal lesion which may reflect a   proteinaceous or hemorrhagic cyst but is incompletely evaluated on the   current exam.  A nonemergent dedicated CT or MRI renal protocol recommended   for complete characterization. INR 2.62 on admission. Was started on heparin      Past Medical History:        Diagnosis Date    Anemia     Arthritis     CAD S/P percutaneous coronary angioplasty 8/13/2012    Anamalous RCA, Stent in Diagonal    GERD (gastroesophageal reflux disease)     Ludivina filter in place     H/O cardiac catheterization 8/9/12    H/O echocardiogram     3/13 Mild MR/TR, 7/12 mildly reduced LVS with inferolateral wall hypokinesis, mildly reduced LV EF 35%, mild lt ventricular dilatation, mod lt atrial enlargement, mild mitral regurg, mild tricuspid regurg    H/O exercise stress test 8/28/12    normal study. EF 48%, exercise 4.6 METS    History of complete ECG 8/22/12    Hx of blood clots     lt  arm    Hx of echocardiogram 07/01/2015    EF 45-50% Trace MR and TR, mild pulm. htn.      Hypercoagulable state ([address]     Hyperlipidemia     Hypertension     Pulmonary embolism ([facility] 1980    RBBB (right bundle branch block with left anterior fascicular block)     Renal insufficiency 9/19/2018    Creatinine 1.3, 8/2018    Thyroid disease     Venous insufficiency        Past Surgical History:        Procedure Laterality Date    CORONARY ANGIOPLASTY WITH STENT PLACEMENT  8/13/12    diag stented    KNEE SURGERY      LEG TENDON SURGERY      TONSILLECTOMY      TUMOR REMOVAL           Current Medications:    Medications    Scheduled Medications:    lidocaine  1 patch Transdermal Daily    aspirin  81 mg Oral Daily    atorvastatin  80 mg Oral Daily    calcium carbonate  750 mg Oral Daily    carvedilol  12.5 mg Oral BID WC    citalopram  40 mg Oral Daily    imipramine  25 mg Oral Nightly    levothyroxine  112 mcg Oral Daily    pantoprazole  20 mg Oral Daily    tamsulosin  0.4 mg Oral Daily    sodium chloride flush  10 mL Intravenous 2 times per day    enoxaparin  1 mg/kg Subcutaneous BID     PRN Medications: sodium chloride flush, acetaminophen, nitroGLYCERIN, sodium chloride flush, magnesium hydroxide, ondansetron, traMADol, hydrALAZINE (APRESOLINE) ivpb      Allergies:  Plavix [clopidogrel] and Poison ivy treatment [benzyl alcohol-camphor-menthol]    Social History:   TOBACCO:   reports that he has quit smoking. He has never used smokeless tobacco.  ETOH:   reports previous alcohol use. Family History:       Problem Relation Age of Onset    Heart Disease Mother     Stroke Mother     Heart Disease Brother     Cancer Father     Heart Disease Maternal Uncle        REVIEW OF SYSTEMS:    Constitutional:  No weight loss, No fever, No chills, No night sweats.  Energy level fair  Eyes:  No diplopia, No transient or permanent loss of vision, No scotomata. ENT / Mouth:  No epistaxis, No dysphagia, No hoarseness, No oral ulcers, No gingival bleeding.  No sore throat, No postnasal drip, No nasal drip, No mouth pain, No sinus pain, No tinnitus, Normal hearing. Cardiovascular:  No chest pain, No palpitations, No syncope, No upper extremity edema, No lower extremity edema, No calf discomfort. Respiratory:  No cough.  No hemoptysis, No pleurisy, No wheezing, No dyspnea. Gastrointestinal:  No abdominal pain, No abdominal cramping, No nausea, No vomiting, No constipation, No diarrhea, No hemotochezia, No melena, No jaundice, No dyspepsia, No dysphagia. Urinary:  No dysuria, No hematuria, No urinary incontinence. Musculoskeletal:  back pain  Skin:  No rash, No nodules, No pruritus, No lesions. Neurologic:  No confusion, No seizures, No syncope, No tremor, No speech change, No headache, No hiccups, No abnormal gait, No sensory changes, No weakness. Psychiatric:  No depression, No anxiety, Concentration normal.  Endocrine:  No polyuria, No polydipsia, No hot flashes, No thyroid symptoms.   Hematologic:  No epistaxis, No gingival bleeding, No petechiae, No ecchymosis. Lymphatic:  No lymphadenopathy, No lymphedema. Allergy / Immunologic:  No eczema, No frequent mucous infections, No frequent respiratory infections, No recurrent urticarial, No frequent skin infections. PHYSICAL EXAM:      Vitals:    BP (!) 172/97   Pulse 85   Temp 98 °F (36.7 °C) (Oral)   Resp 22   Ht 5' 11\" (1.803 m)   Wt 185 lb (83.9 kg)   SpO2 98%   BMI 25.80 kg/m²   CONSTITUTIONAL: awake, alert, cooperative, no apparent distress   EYES: pupils equal, round and reactive to light, sclera clear and conjunctiva normal  ENT: Normocephalic, without obvious abnormality, atraumatic  NECK: supple, symmetrical, no jugular venous distension and no carotid bruits   HEMATOLOGIC/LYMPHATIC: no cervical, supraclavicular or axillary lymphadenopathy   LUNGS: no increased work of breathing and clear to auscultation   CARDIOVASCULAR: regular rate and rhythm, normal S1 and S2, no murmur noted  ABDOMEN: normal bowel sounds x 4, soft, non-distended, non-tender, no masses palpated, no hepatosplenomgaly   MUSCULOSKELETAL: full range of motion noted, tone is normal  NEUROLOGIC: awake, alert, oriented to name, place and time. Motor skills grossly intact.   SKIN: Normal skin color, texture, turgor and no jaundice. appears intact   EXTREMITIES: no LE edema     DATA:    ABGs: No results found for: PHART, PO2ART, DXX2EPX  CBC:   Recent Labs     01/04/20  1420 01/05/20  0814   WBC 8.2 7.9   HGB 15.0 14.6    243     BMP:    Recent Labs     01/04/20  1420 01/05/20  0814    130*   K 4.6 3.9   CL 99 93*   CO2 25 26   BUN 11 16   CREATININE 1.1 1.2   GLUCOSE 100* 96     Magnesium: No results found for: MG  Hepatic:   Recent Labs     01/04/20  1420   AST 18   ALT 13   BILITOT 0.8   ALKPHOS 91     No results for input(s): LIPASE, AMYLASE in the last 72 hours. Recent Labs     01/04/20  1420   PROTIME 32.1*   INR 2.63     No results for input(s): PTT in the last 72 hours.   Lipids: No results for input(s): CHOL, HDL in the last 72 hours. Invalid input(s): LDLCALCU  INR:   Recent Labs     01/04/20  1420   INR 2.63     TSH:   Lab Results   Component Value Date    TSH 2.56 08/14/2018     No intake or output data in the 24 hours ending 01/06/20 0753       Bilateral PE: with underlying h/o protein C def. Recommend LE u/s to r/o DVT. Also assess IVC filter status. If DVT and filter inactive then would recommend Filter replacement. Recommend transition DOAC as he seems to have failed coumadin. Monitor for any bleeding. Avoid falls, deep cuts and working with heavy machinery. Recommend compression stockings.      Continue other medical care    Thank you for letting us be part of the care and will follow along     Discussed the above findings with him and Dr Conchita Goncalves MD  1/6/2020  7:53 AM

## 2020-01-08 LAB
EKG ATRIAL RATE: 86 BPM
EKG DIAGNOSIS: NORMAL
EKG P AXIS: 37 DEGREES
EKG P-R INTERVAL: 178 MS
EKG Q-T INTERVAL: 444 MS
EKG QRS DURATION: 162 MS
EKG QTC CALCULATION (BAZETT): 531 MS
EKG R AXIS: -69 DEGREES
EKG T AXIS: 62 DEGREES
EKG VENTRICULAR RATE: 86 BPM

## 2020-02-12 ENCOUNTER — HOSPITAL ENCOUNTER (OUTPATIENT)
Dept: MRI IMAGING | Age: 75
Discharge: HOME | End: 2020-02-12
Payer: COMMERCIAL

## 2020-02-12 LAB
GFR AFRICAN AMERICAN: >60 ML/MIN/1.73M2
GFR NON-AFRICAN AMERICAN: 57 ML/MIN/1.73M2
POC CREATININE: 1.2 MG/DL (ref 0.9–1.3)

## 2020-02-12 PROCEDURE — A9577 INJ MULTIHANCE: HCPCS | Performed by: INTERNAL MEDICINE

## 2020-02-12 PROCEDURE — 6360000004 HC RX CONTRAST MEDICATION: Performed by: INTERNAL MEDICINE

## 2020-02-12 PROCEDURE — 74183 MRI ABD W/O CNTR FLWD CNTR: CPT

## 2020-02-12 RX ADMIN — GADOBENATE DIMEGLUMINE 8 ML: 529 INJECTION, SOLUTION INTRAVENOUS at 09:37

## 2020-09-09 LAB
ALBUMIN SERPL-MCNC: 3.9 G/DL
ALP BLD-CCNC: 64 U/L
ALT SERPL-CCNC: 14 U/L
ANION GAP SERPL CALCULATED.3IONS-SCNC: NORMAL MMOL/L
AST SERPL-CCNC: 15 U/L
BASOPHILS ABSOLUTE: ABNORMAL
BASOPHILS RELATIVE PERCENT: ABNORMAL
BILIRUB SERPL-MCNC: 0.7 MG/DL (ref 0.1–1.4)
BUN BLDV-MCNC: 15 MG/DL
CALCIUM SERPL-MCNC: 9.3 MG/DL
CHLORIDE BLD-SCNC: 96 MMOL/L
CHOLESTEROL, TOTAL: 105 MG/DL
CHOLESTEROL/HDL RATIO: NORMAL
CO2: 26 MMOL/L
CREAT SERPL-MCNC: 1.2 MG/DL
EOSINOPHILS ABSOLUTE: ABNORMAL
EOSINOPHILS RELATIVE PERCENT: ABNORMAL
GFR CALCULATED: NORMAL
GLUCOSE BLD-MCNC: 85 MG/DL
HCT VFR BLD CALC: 40.4 % (ref 41–53)
HDLC SERPL-MCNC: 48 MG/DL (ref 35–70)
HEMOGLOBIN: 13.8 G/DL (ref 13.5–17.5)
LDL CHOLESTEROL CALCULATED: 45 MG/DL (ref 0–160)
LYMPHOCYTES ABSOLUTE: ABNORMAL
LYMPHOCYTES RELATIVE PERCENT: ABNORMAL
MCH RBC QN AUTO: 29.8 PG
MCHC RBC AUTO-ENTMCNC: 34 G/DL
MCV RBC AUTO: 87.6 FL
MONOCYTES ABSOLUTE: ABNORMAL
MONOCYTES RELATIVE PERCENT: ABNORMAL
NEUTROPHILS ABSOLUTE: ABNORMAL
NEUTROPHILS RELATIVE PERCENT: ABNORMAL
NONHDLC SERPL-MCNC: NORMAL MG/DL
PLATELET # BLD: 194 K/ΜL
PMV BLD AUTO: ABNORMAL FL
POTASSIUM SERPL-SCNC: 4.7 MMOL/L
PROSTATE SPECIFIC ANTIGEN: 1 NG/ML
RBC # BLD: 4.61 10^6/ΜL
SODIUM BLD-SCNC: 130 MMOL/L
T4 FREE: 1.22
TOTAL PROTEIN: 6.1
TRIGL SERPL-MCNC: 61 MG/DL
TSH SERPL DL<=0.05 MIU/L-ACNC: 3.32 UIU/ML
VITAMIN D 25-HYDROXY: 38
VITAMIN D2, 25 HYDROXY: NORMAL
VITAMIN D3,25 HYDROXY: NORMAL
VLDLC SERPL CALC-MCNC: 12 MG/DL
WBC # BLD: 6.5 10^3/ML

## 2020-09-16 ENCOUNTER — OFFICE VISIT (OUTPATIENT)
Dept: CARDIOLOGY CLINIC | Age: 75
End: 2020-09-16
Payer: COMMERCIAL

## 2020-09-16 VITALS
HEIGHT: 71 IN | SYSTOLIC BLOOD PRESSURE: 134 MMHG | DIASTOLIC BLOOD PRESSURE: 80 MMHG | HEART RATE: 56 BPM | BODY MASS INDEX: 25.65 KG/M2 | WEIGHT: 183.2 LBS

## 2020-09-16 PROCEDURE — 99214 OFFICE O/P EST MOD 30 MIN: CPT | Performed by: INTERNAL MEDICINE
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
## 2020-09-16 NOTE — ASSESSMENT & PLAN NOTE
Patient is now on Eliquis 5 mg twice a day since he had a recurrence of former embolism January 2020. Continue the same and follow-up with PCP.

## 2020-09-16 NOTE — PROGRESS NOTES
Jag Felipe  1945  Mary Hampton MD    Chief complaint and HPI:  Jag Felipe  is a 66 y.o. male following up for known coronary artery disease status post stenting of diagonal artery in 2012 and has hypertension, hyperlipidemia and hypercoagulable state with the history of multiple DVTs and PE. Patient had recurrence of PE in January this year despite being on warfarin with therapeutic INR and now he is on Eliquis. He has a history of IVC filter placement as well. He denies any chest pains or shortness of breath. He exercises regularly and stays active. He had had some problem with the back and it has slowed him down. He brought all his medications and they have been reviewed with him and he brought most recent labs from September 9th and we have reviewed them as well with him. Rest of the Cardiovascular system review is otherwise unchanged from prior encounter. Past medical history:  has a past medical history of Anemia, Arthritis, CAD S/P percutaneous coronary angioplasty, GERD (gastroesophageal reflux disease), Ludivina filter in place, H/O cardiac catheterization, H/O echocardiogram, H/O exercise stress test, History of complete ECG, Hx of blood clots, Hx of echocardiogram, Hypercoagulable state ([address] Hyperlipidemia, Hypertension, Pulmonary embolism ([address] RBBB (right bundle branch block with left anterior fascicular block), Renal insufficiency, Thyroid disease, and Venous insufficiency. Past surgical history:  has a past surgical history that includes Leg Tendon Surgery; tumor removal; Tonsillectomy; knee surgery; and Coronary angioplasty with stent (8/13/12).   Social History:   Social History     Tobacco Use    Smoking status: Former Smoker    Smokeless tobacco: Never Used    Tobacco comment: reviewed 03/09/16   Substance Use Topics    Alcohol use: Not Currently     Comment: caffeine 2 cups of coffee a day     Family history: family history includes Cancer in his father; Heart Disease in his brother, maternal uncle, and mother; Stroke in his mother. ALLERGIES:  Plavix [clopidogrel] and Poison ivy treatment [benzyl alcohol-camphor-menthol]  Prior to Admission medications    Medication Sig Start Date End Date Taking? Authorizing Provider   apixaban (ELIQUIS STARTER PACK) 5 MG TABS tablet Take 10 mg (2 tablets) orally twice daily for 7 days, then take 5 mg (1 tablet) orally twice daily thereafter. 1/6/20  Yes Rae Arcos MD   psyllium (KONSYL) 28.3 % PACK Take 1 packet by mouth daily   Yes Historical Provider, MD   acetaminophen (TYLENOL) 500 MG tablet Take 500 mg by mouth every 6 hours as needed for Pain   Yes Historical Provider, MD   citalopram (CELEXA) 40 MG tablet  8/23/18  Yes Historical Provider, MD   imipramine (TOFRANIL) 25 MG tablet 50 mg nightly  9/8/17  Yes Historical Provider, MD   tamsulosin (FLOMAX) 0.4 MG capsule  8/28/17  Yes Historical Provider, MD   alendronate (FOSAMAX) 70 MG tablet Take 70 mg by mouth every 7 days   Yes Historical Provider, MD   calcium carbonate (TUMS EX) 750 MG chewable tablet Take 1 tablet by mouth 2 times daily    Yes Historical Provider, MD   atorvastatin (LIPITOR) 80 MG tablet Take 80 mg by mouth daily  8/14/14  Yes Historical Provider, MD   carvedilol (COREG) 12.5 MG tablet Take 12.5 mg by mouth 2 times daily (with meals). Yes Historical Provider, MD   pantoprazole (PROTONIX) 40 MG tablet   Take 20 mg by mouth daily    Yes Historical Provider, MD   aspirin 81 MG EC tablet Take 81 mg by mouth daily. Yes Historical Provider, MD   levothyroxine (SYNTHROID) 112 MCG tablet Take 112 mcg by mouth daily. Yes Historical Provider, MD   nitroGLYCERIN (NITROSTAT) 0.4 MG SL tablet Place 0.4 mg under the tongue every 5 minutes as needed.      Yes Historical Provider, MD     Vitals:    09/16/20 0805   BP: 134/80   Site: Left Upper Arm   Pulse: 56   Weight: 183 lb 3.2 oz (83.1 kg)   Height: 5' 11\" (1.803 m)      Body mass index is 25.55 kg/m². Wt Readings from Last 3 Encounters:   09/16/20 183 lb 3.2 oz (83.1 kg)   01/04/20 185 lb (83.9 kg)   09/18/19 188 lb 9.6 oz (85.5 kg)     Constitutional: Pleasant well-built and nourished gentleman in no apparent distress. He lost 5 pounds since last visit. Eyes: Pupils are equal in both eyes.  He wears glasses and he is wearing a facemask. NECK: No JVP or thyromegaly  Cardiovascular:  Auscultation: Normal S1 and S2.  No murmurs or gallops noted. Yady Pun negative for bruits. .  Abdominal aorta is normal..  No epigastric bruit noted. Peripheral pulses: 1+ equal in both dorsalis pedis and posterior tibial locations. Respiratory:  Respiratory effort is normal.  Breath sounds are clear to auscultation. Extremities:  No edema, clubbing,  Cyanosis, petechiae. SKIN: Warm and well perfused, no pallor or cyanosis  Abdomen:  No masses or tenderness. No organomegaly noted. Musculoskeletal:  No obvious spinal deformities noted.  Gait is normal.  Muscle strength is normal.  Neurologic:  Oriented to time, place, and person and non-anxious. No focal neurological deficit noted. Psychiatric: Normal mood and effect    EKG on January 4, 2020 reported  Normal sinus rhythm 86 bpm , Possible Left atrial enlargement   Left axis deviation, Right bundle branch block   Left ventricular hypertrophy   Cannot rule out Septal infarct , age undetermined   Abnormal ECG     Lower extremity venous Doppler January 6, 2020 reported bilateral popliteal cyst and there was no evidence of DVT. CTA pulmonary on January 4, 2020 reported multiple small filling defects within the segmental branches of bilateral lower lobes and right upper lobe as well as right middle lobe most compatible with small pulmonary emboli. There was no evidence of right heart strain or pulmonary hypertension.         LAB REVIEW:  CBC:   Lab Results   Component Value Date    WBC 7.9 01/05/2020    HGB 14.6 01/05/2020    HCT 45.9 01/05/2020     related to that system including errors in grammar, punctuation, and spelling, as well as words and phrases that may be inappropriate. If there are any questions or concerns please feel free to contact the dictating provider for clarification.

## 2021-01-11 ENCOUNTER — HOSPITAL ENCOUNTER (OUTPATIENT)
Dept: INFUSION THERAPY | Age: 76
Discharge: HOME | End: 2021-01-11
Payer: COMMERCIAL

## 2021-01-11 ENCOUNTER — OFFICE VISIT (OUTPATIENT)
Dept: ONCOLOGY | Age: 76
End: 2021-01-11
Payer: COMMERCIAL

## 2021-01-11 VITALS
HEIGHT: 71 IN | BODY MASS INDEX: 26.12 KG/M2 | WEIGHT: 186.6 LBS | SYSTOLIC BLOOD PRESSURE: 124 MMHG | HEART RATE: 78 BPM | RESPIRATION RATE: 18 BRPM | TEMPERATURE: 96.5 F | DIASTOLIC BLOOD PRESSURE: 69 MMHG

## 2021-01-11 DIAGNOSIS — D68.59 HYPERCOAGULABLE STATE (HCC): ICD-10-CM

## 2021-01-11 DIAGNOSIS — I26.99 BILATERAL PULMONARY EMBOLISM (HCC): Primary | ICD-10-CM

## 2021-01-11 PROCEDURE — 99214 OFFICE O/P EST MOD 30 MIN: CPT | Performed by: INTERNAL MEDICINE

## 2021-01-11 PROCEDURE — 99211 OFF/OP EST MAY X REQ PHY/QHP: CPT

## 2021-01-11 ASSESSMENT — PATIENT HEALTH QUESTIONNAIRE - PHQ9
SUM OF ALL RESPONSES TO PHQ QUESTIONS 1-9: 1
2. FEELING DOWN, DEPRESSED OR HOPELESS: 0
SUM OF ALL RESPONSES TO PHQ QUESTIONS 1-9: 1
SUM OF ALL RESPONSES TO PHQ QUESTIONS 1-9: 1

## 2021-01-11 NOTE — PROGRESS NOTES
1/4/2020 INR of 2.63 BNP of 3383 troponin of less than 0.01 CMP within normal limits CBC with differential basically within normal limits as well    1/17/20: Arrived with his wife to the clinic today. Reports that his chest hurts when he takes a deep breath in. SOB on climbing the stairs since PE. No palpitations or dizziness. No fever, cough, hemoptysis, lad, night sweats and weight loss. Occasional small nose bleeds. Otherwise no other overt bleeding. No abdominal pain, nausea, emesis, diarrhea or any constipation. No LE edema. NO  sx. Pain in the back when he lies down. 2/2020: MRI abdomen:  1. The kidneys contain simple and mildly complicated cysts measuring up to 2   cm.  No suspicious renal lesion. 2. Small hiatal hernia.          Past Medical History  CAD, OA, HTN, HLP, depression, PREET    Surgical History        Allergies  Plavix    Medications  Carvedilol Oral 12.5 mg tablet 1 tablet daily   Citalopram Oral 40 mg tablet 1 tablet daily   Imipramine Oral 25 mg tablet 1 tablet at bedtime   Pantoprazole (Sodium) Oral Delayed Release 40 mg tablet,delayed release (DR/EC) 1/2 tablet,delayed release (DR/EC) qam   Psyllium Oral Powder   Tamsulosin Oral 0.4 mg capsule 1 capsule daily   Famotidine Oral 20 mg tablet 1 tablet daily   Acetaminophen Oral 500 mg tablet 1 tablet every 6 hours prn for pain   Apixaban Oral 5 mg tablet 1 tablet bid   Atorvastatin Oral 80 mg tablet 1 capsule daily   Alendronate Oral (Weekly) 70 mg tablet 1 tablet qweekly   Calcium Carbonate Oral bid   Tramadol Oral 50 mg tablet 1 tablet q4 hours prn for wrist pain   Levothyroxine Oral 112 mcg tablet 1 tablet daily   Nitroglycerin Sublingual 0.4 mg tablet, sublingual 1 tablet, sublingual every 5 minutes prn for congestive heart failure   Aspirin Oral 81 mg tablet,delayed release (DR/EC) 1 tablet daily    Social History  Lives with wife  Two children and 4 Grandchildren  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 No ETOH or any other illicit drug abuse    Family History  Mother: Hypertension  Father with prostate cancer and diseased at age 80 sec to that  No h/o blood clots. Interval History  1/11/2021:Arrived alone to the clinic today. Reported that he feels cold sensation in the LE lately. No pain or any swelling. No chest pain, increased sob, palpitations or any dizziness. No bleeding or any rash. No dysphagia or any odynophagia. No abdominal pain, nausea, emesis, diarrhea or any constipation. No  sx.     Review of Systems   Per interval history; otherwise 10 point ROS is negative              Vital Signs:  /69 (Site: Left Upper Arm, Position: Sitting, Cuff Size: Large Adult)   Pulse 78   Temp 96.5 °F (35.8 °C) (Infrared)   Resp 18   Ht 5' 11\" (1.803 m)   Wt 186 lb 9.6 oz (84.6 kg)   BMI 26.03 kg/m²     Physical Exam:  CONSTITUTIONAL: awake, alert  EYES: ANUJ, No palor or any icetrus  ENT:ATNC  NECK:No JVD  HEMATOLOGIC/LYMPHATIC: no cervical, supraclavicular or axillary lymphadenopathy   LUNGS:CTAB  CARDIOVASCULAR: regular rate and rhythm, normal S1 and S2, no murmur noted  ABDOMEN: soft ntnd bs pos, no hsm  NEUROLOGIC: GI  SKIN: No rash  EXTREMITIES: mild LE edema bilaterally, chronic venous stasis changes.          Labs:    Hematology:  Lab Results   Component Value Date    WBC 6.5 09/09/2020    RBC 4.61 09/09/2020    HGB 13.8 09/09/2020    HCT 40.4 (A) 09/09/2020    MCV 87.6 09/09/2020    MCH 29.8 09/09/2020    MCHC 34.0 09/09/2020    RDW 14.6 01/05/2020     09/09/2020    MPV 10.3 01/05/2020    SEGSPCT 68.6 (H) 01/05/2020    EOSRELPCT 2.4 01/05/2020    BASOPCT 1.0 01/05/2020    LYMPHOPCT 18.3 (L) 01/05/2020    MONOPCT 9.4 (H) 01/05/2020    SEGSABS 5.4 01/05/2020    EOSABS 0.2 01/05/2020    BASOSABS 0.1 01/05/2020    LYMPHSABS 1.4 01/05/2020    MONOSABS 0.7 01/05/2020    DIFFTYPE AUTOMATED DIFFERENTIAL 01/05/2020 No results found for: ESR    Chemistry:  Lab Results   Component Value Date     09/09/2020    K 4.7 09/09/2020    CL 96 09/09/2020    CO2 26 09/09/2020    BUN 15 09/09/2020    CREATININE 1.2 09/09/2020    GLUCOSE 85 09/09/2020    CALCIUM 9.3 09/09/2020    PROT 7.3 01/04/2020    LABALBU 3.9 09/09/2020    BILITOT 0.7 09/09/2020    ALKPHOS 64 09/09/2020    AST 15 09/09/2020    ALT 14 09/09/2020    LABGLOM 57 (L) 02/12/2020    GFRAA >60 02/12/2020     No results found for: MMA, LDH, HOMOCYSTEINE  No components found for: LD  Lab Results   Component Value Date    T4FREE 1.22 09/09/2020       Immunology:  Lab Results   Component Value Date    PROT 7.3 01/04/2020     No results found for: Gae Mike, KLFLCR  No results found for: B2M    Coagulation Panel:  Lab Results   Component Value Date    PROTIME 32.1 (H) 01/04/2020    INR 2.63 01/04/2020    APTT 27.4 07/01/2015       Anemia Panel:  No results found for: Briana Bank, FOLATE    Tumor Markers:  Lab Results   Component Value Date    PSA 1.00 09/09/2020         Imaging: Reviewed     Pathology:Reviewed     Observations:  Performance Status: ECOG 0  Depression Status: PHQ-9 Total Score: 1 (1/11/2021  1:29 PM)          Assessment & Plan:  Bilateral PE: with underlying h/o protein C def and developed these while on therapeutic coumadin. Recommended transition DOAC as he seems to have failed coumadin. He is on eliquis. Monitor for any bleeding. Avoid falls, deep cuts and working with heavy machinery. Recommend compression stockings. Increased activity as tolerated and feet elevation. Right renal cysts per MRI. No recommendation for further evaluation.      Continue other medical care    Thank you for letting us be part of the care and will follow along     Discussed the above findings with him and he verbalized understanding Discussed healthy lifestyle , healthy diet, regular excercise as tolerated. Discussed the importance of being uptodate with age appropriate screening tools, colonoscopy 2018 normal per him and PSA September 2020 normal.     Recommend follow up with PCP and other specialists.      RTC prn    NORMA           Electronically signed by Denver Mesa MD on 1/11/2021 at 1:58 PM

## 2021-01-11 NOTE — PROGRESS NOTES
MA Rooming Questions  Patient: Charo Verde  MRN: [id number]    Date: 1/11/2021        1. Do you have any new issues?   no        2. Do you need any refills on medications?    no    3. Have you had any imaging done since your last visit?   no    4. Have you been hospitalized or seen in the emergency room since your last visit here? ER  5. Did the patient have a depression screening completed today?  Yes    PHQ-9 Total Score: 1 (1/11/2021  1:29 PM)       PHQ-9 Given to (if applicable):               PHQ-9 Score (if applicable):                     [] Positive     []  Negative              Does question #9 need addressed (if applicable)                     [] Yes    []  No               Franca Hanna MA

## 2021-09-29 ENCOUNTER — OFFICE VISIT (OUTPATIENT)
Dept: CARDIOLOGY CLINIC | Age: 76
End: 2021-09-29
Payer: COMMERCIAL

## 2021-09-29 VITALS
HEART RATE: 77 BPM | SYSTOLIC BLOOD PRESSURE: 138 MMHG | BODY MASS INDEX: 26.22 KG/M2 | DIASTOLIC BLOOD PRESSURE: 88 MMHG | WEIGHT: 177 LBS | HEIGHT: 69 IN

## 2021-09-29 DIAGNOSIS — I10 ESSENTIAL HYPERTENSION: Chronic | ICD-10-CM

## 2021-09-29 DIAGNOSIS — E78.00 PURE HYPERCHOLESTEROLEMIA: Chronic | ICD-10-CM

## 2021-09-29 DIAGNOSIS — Z98.61 CAD S/P PERCUTANEOUS CORONARY ANGIOPLASTY: Primary | ICD-10-CM

## 2021-09-29 DIAGNOSIS — I25.10 CAD S/P PERCUTANEOUS CORONARY ANGIOPLASTY: Primary | ICD-10-CM

## 2021-09-29 DIAGNOSIS — D68.59 HYPERCOAGULABLE STATE (HCC): ICD-10-CM

## 2021-09-29 PROCEDURE — 99214 OFFICE O/P EST MOD 30 MIN: CPT | Performed by: INTERNAL MEDICINE

## 2021-09-29 PROCEDURE — 93000 ELECTROCARDIOGRAM COMPLETE: CPT | Performed by: INTERNAL MEDICINE

## 2021-09-29 NOTE — PATIENT INSTRUCTIONS
Continue current cardiovascular medications which have been reviewed and discussed individually with you. Primary/secondary prevention is the goal by aggressive risk modification, healthy and therapeutic life style changes for cardiovascular risk reduction. Various goals are discussed and questions answered. Discussed GDMT and stress test recommendation for post PCI patients. Follow-up in 12 months with EKG, sooner if needed.

## 2021-09-29 NOTE — PROGRESS NOTES
Lamonte Gilford (:  1945) is a 64 y.o. male,     Chief Complaint   Patient presents with    Hypertension     Patient here for 1 yr f/u. Patient has no complaints today. Patient drinks 2 cups of coffee every morning and does not drink alcohol or smoke. Patient does exercise but has no recently he injured his back. Patient is here for follow up for coronary artery disease hypertension hyperlipidemia and abnormal EKG with right bundle branch block. He also has hypercoagulable state on long-term anticoagulation therapy status post [place] filter in place. Denies any cardiac symptoms today. Denies any leg edema or shortness of breath or palpitations. He injured his back while trying to pick his wife up 2 weeks ago as she has fallen and he has not been able to exercise much since then. He has seen PCP is using steroids and symptoms are getting better. Otherwise he used to go to [employer] regularly to exercise 4 times a week. Medications are reviewed and he has been compliant with his medications. He does not smoke. Allergies   Allergen Reactions    Plavix [Clopidogrel]      Skin rash      Poison Ivy Treatment [Benzyl Alcohol-Camphor-Menthol]      Prior to Admission medications    Medication Sig Start Date End Date Taking? Authorizing Provider   apixaban (ELIQUIS STARTER PACK) 5 MG TABS tablet Take 10 mg (2 tablets) orally twice daily for 7 days, then take 5 mg (1 tablet) orally twice daily thereafter.  20  Yes Rinku Perkins MD   psyllium (KONSYL) 28.3 % PACK Take 1 packet by mouth daily   Yes Historical Provider, MD   acetaminophen (TYLENOL) 500 MG tablet Take 500 mg by mouth every 6 hours as needed for Pain   Yes Historical Provider, MD   citalopram (CELEXA) 40 MG tablet  18  Yes Historical Provider, MD   imipramine (TOFRANIL) 25 MG tablet 50 mg nightly  17  Yes Historical Provider, MD   tamsulosin (FLOMAX) 0.4 MG capsule  17  Yes Historical Provider, MD alendronate (FOSAMAX) 70 MG tablet Take 70 mg by mouth every 7 days   Yes Historical Provider, MD   calcium carbonate (TUMS EX) 750 MG chewable tablet Take 1 tablet by mouth 2 times daily    Yes Historical Provider, MD   atorvastatin (LIPITOR) 80 MG tablet Take 80 mg by mouth daily  8/14/14  Yes Historical Provider, MD   carvedilol (COREG) 12.5 MG tablet Take 12.5 mg by mouth 2 times daily (with meals). Yes Historical Provider, MD   pantoprazole (PROTONIX) 40 MG tablet   Take 20 mg by mouth daily    Yes Historical Provider, MD   aspirin 81 MG EC tablet Take 81 mg by mouth daily. Yes Historical Provider, MD   levothyroxine (SYNTHROID) 112 MCG tablet Take 112 mcg by mouth daily. Yes Historical Provider, MD   nitroGLYCERIN (NITROSTAT) 0.4 MG SL tablet Place 0.4 mg under the tongue every 5 minutes as needed. Yes Historical Provider, MD     Past Medical History:   Diagnosis Date    Anemia     Arthritis     CAD S/P percutaneous coronary angioplasty 8/13/2012    [place] RCA, Stent in Diagonal    GERD (gastroesophageal reflux disease)     [place] filter in place     H/O cardiac catheterization 8/9/12    H/O echocardiogram     3/13 Mild MR/TR, 7/12 mildly reduced LVS with inferolateral wall hypokinesis, mildly reduced LV EF 35%, mild lt ventricular dilatation, mod lt atrial enlargement, mild mitral regurg, mild tricuspid regurg    H/O exercise stress test 8/28/12    normal study. EF 48%, exercise 4.6 METS    History of complete ECG 8/22/12    Hx of blood clots     lt  arm    Hx of echocardiogram 07/01/2015    EF 45-50% Trace MR and TR, mild pulm. htn.      Hypercoagulable state ([address]     Hyperlipidemia     Hypertension     Pulmonary embolism ([address] 1980    RBBB (right bundle branch block with left anterior fascicular block)     Renal insufficiency 9/19/2018    Creatinine 1.3, 8/2018    Thyroid disease     Venous insufficiency       Vitals:    09/29/21 0804   BP: 138/88   Pulse: 77 Weight: 177 lb (80.3 kg)   Height: 5' 9\" (1.753 m)      Body mass index is 26.14 kg/m². Wt Readings from Last 3 Encounters:   09/29/21 177 lb (80.3 kg)   01/11/21 186 lb 9.6 oz (84.6 kg)   09/16/20 183 lb 3.2 oz (83.1 kg)     Constitutional: Pleasant well-built and nourished gentleman in no apparent distress. He lost 6 pounds since last visit. Eyes: Pupils are equal in both eyes.  He wears glasses and he is wearing a facemask. NECK: No JVP or thyromegaly  Cardiovascular:  Auscultation: Normal S1 and S2.  No murmurs or gallops noted. Tiarra Cathy negative for bruits. .  Abdominal aorta is normal..  No epigastric bruit noted. Peripheral pulses: 1+ equal in both dorsalis pedis and posterior tibial locations. Respiratory:  Respiratory effort is normal. Breath sounds are clear to auscultation. Extremities:  No edema, clubbing,  Cyanosis, petechiae. SKIN: Warm and well perfused, no pallor or cyanosis  Abdomen:  No masses or tenderness. No organomegaly noted. Musculoskeletal:  No obvious spinal deformities noted.  Gait is normal.  Muscle strength is normal.  Neurologic:  Oriented to time, place, and person and non-anxious. No focal neurological deficit noted. Psychiatric: Normal mood and effect    EKG today is consistent with sinus rhythm with right bundle branch block 77 bpm and left anterior fascicular block. Compared to last year there is no significant change.     Pertinent records reviewed and discussed with patient and results are as follow:    Lab Results   Component Value Date    WBC 6.5 09/09/2020    HGB 13.8 09/09/2020    HCT 40.4 09/09/2020     09/09/2020     Lab Results   Component Value Date    CHOL 105 09/09/2020    TRIG 61 09/09/2020    HDL 48 09/09/2020    LDLCALC 45 09/09/2020    LDLDIRECT 51 02/19/2013     Lab Results   Component Value Date    BUN 15 09/09/2020    CREATININE 1.2 09/09/2020     09/09/2020    K 4.7 09/09/2020     Lab Results   Component Value Date    INR 2.63 01/04/2020     Lab Results   Component Value Date    LABA1C 6.1 07/01/2015     ASSESSMENT/PLAN:    1. CAD S/P percutaneous coronary angioplasty  -     EKG 12 lead  2. Essential hypertension  3. Pure hypercholesterolemia  4. Hypercoagulable state ([address]    Continue current cardiovascular medications which have been reviewed and discussed individually with you. Primary/secondary prevention is the goal by aggressive risk modification, healthy and therapeutic life style changes for cardiovascular risk reduction. Various goals are discussed and questions answered. Discussed GDMT and stress test recommendation for post PCI patients. Follow-up in 12 months with EKG, sooner if needed. An electronic signature was used to authenticate this note.     --Felecia Ramirez MD

## 2021-11-10 ENCOUNTER — TELEPHONE (OUTPATIENT)
Dept: CARDIOLOGY CLINIC | Age: 76
End: 2021-11-10

## 2022-09-15 LAB
ALBUMIN SERPL-MCNC: 3.9 G/DL
ALP BLD-CCNC: 70 U/L
ALT SERPL-CCNC: 10 U/L
ANION GAP SERPL CALCULATED.3IONS-SCNC: NORMAL MMOL/L
AST SERPL-CCNC: 17 U/L
BASOPHILS ABSOLUTE: ABNORMAL
BASOPHILS RELATIVE PERCENT: ABNORMAL
BILIRUB SERPL-MCNC: 0.5 MG/DL (ref 0.1–1.4)
BUN BLDV-MCNC: 14 MG/DL
CALCIUM SERPL-MCNC: 9.5 MG/DL
CHLORIDE BLD-SCNC: 104 MMOL/L
CHOLESTEROL, TOTAL: 112 MG/DL
CHOLESTEROL/HDL RATIO: NORMAL
CO2: NORMAL
CREAT SERPL-MCNC: 1 MG/DL
EOSINOPHILS ABSOLUTE: ABNORMAL
EOSINOPHILS RELATIVE PERCENT: ABNORMAL
GFR CALCULATED: 77
GLUCOSE BLD-MCNC: 87 MG/DL
HCT VFR BLD CALC: 38.5 % (ref 41–53)
HDLC SERPL-MCNC: 52 MG/DL (ref 35–70)
HEMOGLOBIN: 12.4 G/DL (ref 13.5–17.5)
LDL CHOLESTEROL CALCULATED: 50 MG/DL (ref 0–160)
LYMPHOCYTES ABSOLUTE: ABNORMAL
LYMPHOCYTES RELATIVE PERCENT: ABNORMAL
MCH RBC QN AUTO: 26.3 PG
MCHC RBC AUTO-ENTMCNC: 32.2 G/DL
MCV RBC AUTO: 81.6 FL
MONOCYTES ABSOLUTE: ABNORMAL
MONOCYTES RELATIVE PERCENT: ABNORMAL
NEUTROPHILS ABSOLUTE: ABNORMAL
NEUTROPHILS RELATIVE PERCENT: ABNORMAL
NONHDLC SERPL-MCNC: NORMAL MG/DL
PLATELET # BLD: 329 K/ΜL
PMV BLD AUTO: ABNORMAL FL
POTASSIUM SERPL-SCNC: 5.4 MMOL/L
RBC # BLD: 4.72 10^6/ΜL
SODIUM BLD-SCNC: 140 MMOL/L
T4 FREE: 1.22
TOTAL PROTEIN: 6.2
TRIGL SERPL-MCNC: 49 MG/DL
VLDLC SERPL CALC-MCNC: 10 MG/DL
WBC # BLD: 6.6 10^3/ML

## 2022-09-29 ENCOUNTER — OFFICE VISIT (OUTPATIENT)
Dept: CARDIOLOGY CLINIC | Age: 77
End: 2022-09-29
Payer: COMMERCIAL

## 2022-09-29 VITALS
HEART RATE: 59 BPM | WEIGHT: 174.2 LBS | DIASTOLIC BLOOD PRESSURE: 68 MMHG | SYSTOLIC BLOOD PRESSURE: 122 MMHG | BODY MASS INDEX: 26.4 KG/M2 | HEIGHT: 68 IN

## 2022-09-29 DIAGNOSIS — E78.00 PURE HYPERCHOLESTEROLEMIA: Chronic | ICD-10-CM

## 2022-09-29 DIAGNOSIS — I10 PRIMARY HYPERTENSION: Chronic | ICD-10-CM

## 2022-09-29 DIAGNOSIS — N28.9 RENAL INSUFFICIENCY: ICD-10-CM

## 2022-09-29 DIAGNOSIS — Z98.61 CAD S/P PERCUTANEOUS CORONARY ANGIOPLASTY: Primary | ICD-10-CM

## 2022-09-29 DIAGNOSIS — I25.10 CAD S/P PERCUTANEOUS CORONARY ANGIOPLASTY: Primary | ICD-10-CM

## 2022-09-29 DIAGNOSIS — D68.59 HYPERCOAGULABLE STATE (HCC): ICD-10-CM

## 2022-09-29 DIAGNOSIS — I45.2 RBBB (RIGHT BUNDLE BRANCH BLOCK WITH LEFT ANTERIOR FASCICULAR BLOCK): ICD-10-CM

## 2022-09-29 PROCEDURE — 93000 ELECTROCARDIOGRAM COMPLETE: CPT | Performed by: INTERNAL MEDICINE

## 2022-09-29 PROCEDURE — 1123F ACP DISCUSS/DSCN MKR DOCD: CPT | Performed by: INTERNAL MEDICINE

## 2022-09-29 PROCEDURE — 99214 OFFICE O/P EST MOD 30 MIN: CPT | Performed by: INTERNAL MEDICINE

## 2022-09-29 NOTE — PATIENT INSTRUCTIONS
Continue current cardiovascular medications which have been reviewed and discussed individually with you. Continue to exercise regularly. Appropriate prescriptions if needed on this visit are addressed. After visit summery is provided. Continue current cardiovascular medications which have been reviewed and discussed individually with you. Questions answered and patient verbalizes understanding. Follow up in 12 months with ECG,  sooner if needed. Counseled to avoid potassium rich diet and follow up with PCP for elevated potassium.   Please bring all medication bottles and most recent labs to each office visit

## 2022-09-29 NOTE — PROGRESS NOTES
Simona Vazquez  1945  Mercedes Badillo MD      Chief Complaint   Patient presents with    1 Year Follow Up     1 yr f/u  no complaints of cardiac sx  no surgeries or procedures scheduled. Brought copy of labwork,   exercise 3-4 weekly 45 min each time. Chief complaint and HPI:  Simona Vazquez  is a 76 y.o. male following up for coronary artery disease and hypertension and hyper lipidemia. 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 He denies any cardiac symptoms. He exercises at [employer] regularly and keeps himself active and denies any chest pain or shortness of breath or dizziness or palpitations or syncope or near syncope. He brought his most recent labs from September 15, 2022 from Dr. Hema Leal. His potassium is elevated to 5.3. He had not had a follow-up with him yet. Everything else looked great particularly lipid results are excellent with LDL reportedly 50. Medications reviewed and he is compliant with his medication. He does not smoke. Rest of the Cardiovascular system review is otherwise unchanged from prior encounter. Past medical history:  has a past medical history of Anemia, Arthritis, CAD S/P percutaneous coronary angioplasty, GERD (gastroesophageal reflux disease), [place] filter in place, H/O cardiac catheterization, H/O echocardiogram, H/O exercise stress test, History of complete ECG, Hx of blood clots, Hx of echocardiogram, Hypercoagulable state ([address] Hyperlipidemia, Hypertension, Pulmonary embolism ([address] RBBB (right bundle branch block with left anterior fascicular block), Renal insufficiency, Thyroid disease, and Venous insufficiency. Past surgical history:  has a past surgical history that includes Leg Tendon Surgery; tumor removal; Tonsillectomy; knee surgery; and Coronary angioplasty with stent (8/13/12).   Social History:   Social History     Tobacco Use Provider, MD   psyllium (KONSYL) 28.3 % PACK Take 1 packet by mouth daily  Patient not taking: Reported on 9/29/2022    Historical Provider, MD     Vitals:    09/29/22 0918   BP: 122/68   Site: Left Upper Arm   Position: Sitting   Cuff Size: Medium Adult   Pulse: 59   Weight: 174 lb 3.2 oz (79 kg)   Height: 5' 8\" (1.727 m)      Body mass index is 26.49 kg/m². Wt Readings from Last 3 Encounters:   09/29/22 174 lb 3.2 oz (79 kg)   09/29/21 177 lb (80.3 kg)   01/11/21 186 lb 9.6 oz (84.6 kg)     Constitutional:  Patient is well-built and nourished male in no apparent distress. He lost 3 pounds since last visit last year. Eyes: Wearing glasses and has no conjunctival pallor. He is also wearing a facemask. NECK: No JVP or thyromegaly  Cardiovascular: Auscultation: Normal S1 and S2. No significant murmurs or gallops noted. Carotids are negative for bruits. Peripheral pulses: Pedal pulses are 1+ equal in both feet. Respiratory:  Respiratory effort is normal. Breath sounds are clear to auscultation. Extremities:  No edema, clubbing,  Cyanosis, petechiae. SKIN: Warm and well perfused, no pallor or cyanosis  Abdomen:  No masses or tenderness. No organomegaly noted. Musculoskeletal: No obvious spinal deformities noted. Gait is normal.  Muscle strength is normal.  Neurologic:  Oriented to time, place, and person and non-anxious. No focal neurological deficit noted. Psychiatric: Normal mood and effect. ECG today is c/w sinus bradycardia 59 bpm with RBBB, LAFB and first degree AV block. Compared to last year NH interval increased from 220 to 262 msec.     LAB REVIEW:  CBC:   Lab Results   Component Value Date/Time    WBC 6.5 09/09/2020 12:00 AM    HGB 13.8 09/09/2020 12:00 AM    HCT 40.4 09/09/2020 12:00 AM     09/09/2020 12:00 AM     Lipids:   Lab Results   Component Value Date    CHOL 105 09/09/2020    TRIG 61 09/09/2020    HDL 48 09/09/2020    LDLCALC 45 09/09/2020     Renal:   Lab Results   Component Value Date/Time    BUN 15 09/09/2020 12:00 AM    CREATININE 1.2 09/09/2020 12:00 AM     09/09/2020 12:00 AM    K 4.7 09/09/2020 12:00 AM     PT/INR:   Lab Results   Component Value Date/Time    INR 2.63 01/04/2020 02:20 PM     Lab Results   Component Value Date    LABA1C 6.1 07/01/2015     IMPRESSION and RECOMMENDATIONS:      1. CAD S/P stent in Diag 8/2012  Assessment & Plan:  Clinically stable. He did not have any typical symptoms in 2012 prior to stenting his symptoms were more of a fatigue than any chest discomfort. Guideline directed therapy recommends object evaluation once in 5 years and we had this discussion with him today as well as in the past and he rather not have any objective evaluation like a stress test.  Continue aggressive risk factor modification for primary prevention. Orders:  -     EKG 12 lead  2. Primary hypertension  Assessment & Plan:  Well-controlled on carvedilol 12.5 twice a day. Continue the same. Orders:  -     EKG 12 lead  3. Pure hypercholesterolemia  Assessment & Plan:  Well-controlled on Lipitor 80 mg daily. LDL is reported 50 triglycerides 45.   4. RBBB (right bundle branch block with left anterior fascicular block)  Assessment & Plan:  First-degree heart block has worsened compared to last year however he is asymptomatic. He is counseled with regard to symptoms of complete heart block and he needs to call us if he has any dizziness lightheadedness syncope or near syncope. Orders:  -     EKG 12 lead  5. Hypercoagulable state ([address]  6. Renal insufficiency  Assessment & Plan:  Creatinine is 1.0 now which is an improvement from 1.2 however his potassium is 5.3. He is asked to follow-up with Dr. Cayetano Bourgeois and he is counseled to avoid any potassium rich foods like banana and avocado and tomatoes etc.  He is also counseled to increase fluids. Continue current cardiovascular medications which have been reviewed and discussed individually with you.  Continue to exercise regularly. Appropriate prescriptions if needed on this visit are addressed. After visit summery is provided. Continue current cardiovascular medications which have been reviewed and discussed individually with you. Questions answered and patient verbalizes understanding. Follow up in 12 months with ECG,  sooner if needed. Counseled to avoid potassium rich diet and follow up with PCP for elevated potassium    Harry Marcelino MD, 9/29/2022 9:58 AM     Please note this report has been partially produced using speech recognition software and may contain errors related to that system including errors in grammar, punctuation, and spelling, as well as words and phrases that may be inappropriate. If there are any questions or concerns please feel free to contact the dictating provider for clarification.

## 2022-09-29 NOTE — ASSESSMENT & PLAN NOTE
Creatinine is 1.0 now which is an improvement from 1.2 however his potassium is 5.3. He is asked to follow-up with Dr. Bar Rosales and he is counseled to avoid any potassium rich foods like banana and avocado and tomatoes etc.  He is also counseled to increase fluids.

## 2023-01-04 ENCOUNTER — APPOINTMENT (OUTPATIENT)
Dept: GENERAL RADIOLOGY | Age: 78
DRG: 543 | End: 2023-01-04
Payer: COMMERCIAL

## 2023-01-04 ENCOUNTER — APPOINTMENT (OUTPATIENT)
Dept: CT IMAGING | Age: 78
DRG: 543 | End: 2023-01-04
Payer: COMMERCIAL

## 2023-01-04 ENCOUNTER — HOSPITAL ENCOUNTER (INPATIENT)
Age: 78
LOS: 2 days | Discharge: NURSING, REHAB OR LONG-TERM CARE | DRG: 543 | End: 2023-01-07
Attending: EMERGENCY MEDICINE | Admitting: STUDENT IN AN ORGANIZED HEALTH CARE EDUCATION/TRAINING PROGRAM
Payer: COMMERCIAL

## 2023-01-04 DIAGNOSIS — S32.050A CLOSED COMPRESSION FRACTURE OF L5 LUMBAR VERTEBRA, INITIAL ENCOUNTER (HCC): Primary | ICD-10-CM

## 2023-01-04 DIAGNOSIS — S32.010A COMPRESSION FRACTURE OF L1 VERTEBRA, INITIAL ENCOUNTER (HCC): ICD-10-CM

## 2023-01-04 DIAGNOSIS — S32.10XA CLOSED FRACTURE OF SACRUM, UNSPECIFIED PORTION OF SACRUM, INITIAL ENCOUNTER (HCC): ICD-10-CM

## 2023-01-04 PROCEDURE — 73502 X-RAY EXAM HIP UNI 2-3 VIEWS: CPT

## 2023-01-04 PROCEDURE — 72131 CT LUMBAR SPINE W/O DYE: CPT

## 2023-01-04 PROCEDURE — 99285 EMERGENCY DEPT VISIT HI MDM: CPT

## 2023-01-04 PROCEDURE — 6370000000 HC RX 637 (ALT 250 FOR IP): Performed by: EMERGENCY MEDICINE

## 2023-01-04 RX ORDER — HYDROCODONE BITARTRATE AND ACETAMINOPHEN 5; 325 MG/1; MG/1
2 TABLET ORAL ONCE
Status: COMPLETED | OUTPATIENT
Start: 2023-01-04 | End: 2023-01-04

## 2023-01-04 RX ADMIN — HYDROCODONE BITARTRATE AND ACETAMINOPHEN 2 TABLET: 5; 325 TABLET ORAL at 18:47

## 2023-01-04 ASSESSMENT — PAIN - FUNCTIONAL ASSESSMENT
PAIN_FUNCTIONAL_ASSESSMENT: 0-10
PAIN_FUNCTIONAL_ASSESSMENT: 0-10

## 2023-01-04 ASSESSMENT — PAIN SCALES - GENERAL
PAINLEVEL_OUTOF10: 5
PAINLEVEL_OUTOF10: 6
PAINLEVEL_OUTOF10: 8

## 2023-01-04 ASSESSMENT — LIFESTYLE VARIABLES
HOW OFTEN DO YOU HAVE A DRINK CONTAINING ALCOHOL: NEVER
HOW MANY STANDARD DRINKS CONTAINING ALCOHOL DO YOU HAVE ON A TYPICAL DAY: PATIENT DOES NOT DRINK

## 2023-01-04 NOTE — ED TRIAGE NOTES
Pt to the ED with c/o left hip pain after a fall on Friday. Pt reports that he did not hit his and denies any LOC. Pt states he was getting a drink when he tripped. Does take ASA and Eliquis. Pt stated he landed in a kneeling position. Pt states he was able to walk on Friday and woke up today and was unable to walk. Pt states the pain has progressively gotten worse.

## 2023-01-05 PROBLEM — S32.050A CLOSED COMPRESSION FRACTURE OF L5 LUMBAR VERTEBRA, INITIAL ENCOUNTER (HCC): Status: ACTIVE | Noted: 2023-01-05

## 2023-01-05 LAB
ALBUMIN SERPL-MCNC: 3.5 GM/DL (ref 3.4–5)
ALP BLD-CCNC: 113 IU/L (ref 40–128)
ALT SERPL-CCNC: 10 U/L (ref 10–40)
ANION GAP SERPL CALCULATED.3IONS-SCNC: 12 MMOL/L (ref 4–16)
AST SERPL-CCNC: 14 IU/L (ref 15–37)
BASOPHILS ABSOLUTE: 0.1 K/CU MM
BASOPHILS RELATIVE PERCENT: 1.1 % (ref 0–1)
BILIRUB SERPL-MCNC: 0.7 MG/DL (ref 0–1)
BUN BLDV-MCNC: 16 MG/DL (ref 6–23)
CALCIUM SERPL-MCNC: 8.9 MG/DL (ref 8.3–10.6)
CHLORIDE BLD-SCNC: 103 MMOL/L (ref 99–110)
CO2: 25 MMOL/L (ref 21–32)
CREAT SERPL-MCNC: 1 MG/DL (ref 0.9–1.3)
DIFFERENTIAL TYPE: ABNORMAL
EOSINOPHILS ABSOLUTE: 0.3 K/CU MM
EOSINOPHILS RELATIVE PERCENT: 4.7 % (ref 0–3)
GFR SERPL CREATININE-BSD FRML MDRD: >60 ML/MIN/1.73M2
GLUCOSE BLD-MCNC: 84 MG/DL (ref 70–99)
HCT VFR BLD CALC: 35 % (ref 42–52)
HEMOGLOBIN: 10.8 GM/DL (ref 13.5–18)
IMMATURE NEUTROPHIL %: 0.3 % (ref 0–0.43)
INR BLD: 1.6 INDEX
LYMPHOCYTES ABSOLUTE: 2.1 K/CU MM
LYMPHOCYTES RELATIVE PERCENT: 29.2 % (ref 24–44)
MCH RBC QN AUTO: 25.7 PG (ref 27–31)
MCHC RBC AUTO-ENTMCNC: 30.9 % (ref 32–36)
MCV RBC AUTO: 83.3 FL (ref 78–100)
MONOCYTES ABSOLUTE: 0.9 K/CU MM
MONOCYTES RELATIVE PERCENT: 13.3 % (ref 0–4)
NUCLEATED RBC %: 0 %
PDW BLD-RTO: 16.3 % (ref 11.7–14.9)
PLATELET # BLD: 266 K/CU MM (ref 140–440)
PMV BLD AUTO: 9.9 FL (ref 7.5–11.1)
POTASSIUM SERPL-SCNC: 4.3 MMOL/L (ref 3.5–5.1)
PROTHROMBIN TIME: 20.7 SECONDS (ref 11.7–14.5)
RBC # BLD: 4.2 M/CU MM (ref 4.6–6.2)
SEGMENTED NEUTROPHILS ABSOLUTE COUNT: 3.6 K/CU MM
SEGMENTED NEUTROPHILS RELATIVE PERCENT: 51.4 % (ref 36–66)
SODIUM BLD-SCNC: 140 MMOL/L (ref 135–145)
TOTAL IMMATURE NEUTOROPHIL: 0.02 K/CU MM
TOTAL NUCLEATED RBC: 0 K/CU MM
TOTAL PROTEIN: 5.8 GM/DL (ref 6.4–8.2)
TSH HIGH SENSITIVITY: 8.36 UIU/ML (ref 0.27–4.2)
WBC # BLD: 7.1 K/CU MM (ref 4–10.5)

## 2023-01-05 PROCEDURE — 6370000000 HC RX 637 (ALT 250 FOR IP): Performed by: STUDENT IN AN ORGANIZED HEALTH CARE EDUCATION/TRAINING PROGRAM

## 2023-01-05 PROCEDURE — 36415 COLL VENOUS BLD VENIPUNCTURE: CPT

## 2023-01-05 PROCEDURE — 99222 1ST HOSP IP/OBS MODERATE 55: CPT | Performed by: PHYSICIAN ASSISTANT

## 2023-01-05 PROCEDURE — 6360000002 HC RX W HCPCS: Performed by: NURSE PRACTITIONER

## 2023-01-05 PROCEDURE — 84443 ASSAY THYROID STIM HORMONE: CPT

## 2023-01-05 PROCEDURE — 99221 1ST HOSP IP/OBS SF/LOW 40: CPT | Performed by: ORTHOPAEDIC SURGERY

## 2023-01-05 PROCEDURE — 2700000000 HC OXYGEN THERAPY PER DAY

## 2023-01-05 PROCEDURE — 94761 N-INVAS EAR/PLS OXIMETRY MLT: CPT

## 2023-01-05 PROCEDURE — 80053 COMPREHEN METABOLIC PANEL: CPT

## 2023-01-05 PROCEDURE — 2580000003 HC RX 258: Performed by: STUDENT IN AN ORGANIZED HEALTH CARE EDUCATION/TRAINING PROGRAM

## 2023-01-05 PROCEDURE — 85610 PROTHROMBIN TIME: CPT

## 2023-01-05 PROCEDURE — 1200000000 HC SEMI PRIVATE

## 2023-01-05 PROCEDURE — 85025 COMPLETE CBC W/AUTO DIFF WBC: CPT

## 2023-01-05 RX ORDER — HYDROCODONE BITARTRATE AND ACETAMINOPHEN 7.5; 325 MG/1; MG/1
1 TABLET ORAL EVERY 6 HOURS PRN
Status: DISCONTINUED | OUTPATIENT
Start: 2023-01-05 | End: 2023-01-07 | Stop reason: HOSPADM

## 2023-01-05 RX ORDER — SODIUM CHLORIDE 0.9 % (FLUSH) 0.9 %
5-40 SYRINGE (ML) INJECTION PRN
Status: DISCONTINUED | OUTPATIENT
Start: 2023-01-05 | End: 2023-01-07 | Stop reason: HOSPADM

## 2023-01-05 RX ORDER — CITALOPRAM 20 MG/1
20 TABLET ORAL DAILY
Status: DISCONTINUED | OUTPATIENT
Start: 2023-01-05 | End: 2023-01-07 | Stop reason: HOSPADM

## 2023-01-05 RX ORDER — SODIUM CHLORIDE, SODIUM LACTATE, POTASSIUM CHLORIDE, CALCIUM CHLORIDE 600; 310; 30; 20 MG/100ML; MG/100ML; MG/100ML; MG/100ML
INJECTION, SOLUTION INTRAVENOUS CONTINUOUS
Status: ACTIVE | OUTPATIENT
Start: 2023-01-05 | End: 2023-01-05

## 2023-01-05 RX ORDER — CARVEDILOL 6.25 MG/1
12.5 TABLET ORAL 2 TIMES DAILY WITH MEALS
Status: DISCONTINUED | OUTPATIENT
Start: 2023-01-05 | End: 2023-01-07 | Stop reason: HOSPADM

## 2023-01-05 RX ORDER — PANTOPRAZOLE SODIUM 20 MG/1
20 TABLET, DELAYED RELEASE ORAL DAILY
Status: DISCONTINUED | OUTPATIENT
Start: 2023-01-05 | End: 2023-01-07 | Stop reason: HOSPADM

## 2023-01-05 RX ORDER — ONDANSETRON 4 MG/1
4 TABLET, ORALLY DISINTEGRATING ORAL EVERY 8 HOURS PRN
Status: DISCONTINUED | OUTPATIENT
Start: 2023-01-05 | End: 2023-01-07 | Stop reason: HOSPADM

## 2023-01-05 RX ORDER — SODIUM CHLORIDE 0.9 % (FLUSH) 0.9 %
5-40 SYRINGE (ML) INJECTION EVERY 12 HOURS SCHEDULED
Status: DISCONTINUED | OUTPATIENT
Start: 2023-01-05 | End: 2023-01-07 | Stop reason: HOSPADM

## 2023-01-05 RX ORDER — ATORVASTATIN CALCIUM 40 MG/1
80 TABLET, FILM COATED ORAL DAILY
Status: DISCONTINUED | OUTPATIENT
Start: 2023-01-05 | End: 2023-01-07 | Stop reason: HOSPADM

## 2023-01-05 RX ORDER — ACETAMINOPHEN 500 MG
500 TABLET ORAL EVERY 6 HOURS
Status: DISCONTINUED | OUTPATIENT
Start: 2023-01-05 | End: 2023-01-07 | Stop reason: HOSPADM

## 2023-01-05 RX ORDER — MORPHINE SULFATE 2 MG/ML
2 INJECTION, SOLUTION INTRAMUSCULAR; INTRAVENOUS ONCE
Status: COMPLETED | OUTPATIENT
Start: 2023-01-05 | End: 2023-01-05

## 2023-01-05 RX ORDER — ONDANSETRON 2 MG/ML
4 INJECTION INTRAMUSCULAR; INTRAVENOUS EVERY 6 HOURS PRN
Status: DISCONTINUED | OUTPATIENT
Start: 2023-01-05 | End: 2023-01-07 | Stop reason: HOSPADM

## 2023-01-05 RX ORDER — POLYETHYLENE GLYCOL 3350 17 G/17G
17 POWDER, FOR SOLUTION ORAL DAILY
Status: DISCONTINUED | OUTPATIENT
Start: 2023-01-05 | End: 2023-01-07 | Stop reason: HOSPADM

## 2023-01-05 RX ORDER — SODIUM CHLORIDE 9 MG/ML
INJECTION, SOLUTION INTRAVENOUS PRN
Status: DISCONTINUED | OUTPATIENT
Start: 2023-01-05 | End: 2023-01-07 | Stop reason: HOSPADM

## 2023-01-05 RX ORDER — TAMSULOSIN HYDROCHLORIDE 0.4 MG/1
0.4 CAPSULE ORAL DAILY
Status: DISCONTINUED | OUTPATIENT
Start: 2023-01-05 | End: 2023-01-07 | Stop reason: HOSPADM

## 2023-01-05 RX ORDER — LEVOTHYROXINE SODIUM 112 UG/1
112 TABLET ORAL DAILY
Status: DISCONTINUED | OUTPATIENT
Start: 2023-01-05 | End: 2023-01-07 | Stop reason: HOSPADM

## 2023-01-05 RX ADMIN — TAMSULOSIN HYDROCHLORIDE 0.4 MG: 0.4 CAPSULE ORAL at 08:25

## 2023-01-05 RX ADMIN — ATORVASTATIN CALCIUM 80 MG: 40 TABLET, FILM COATED ORAL at 08:26

## 2023-01-05 RX ADMIN — POLYETHYLENE GLYCOL (3350) 17 G: 17 POWDER, FOR SOLUTION ORAL at 08:26

## 2023-01-05 RX ADMIN — SODIUM CHLORIDE, PRESERVATIVE FREE 10 ML: 5 INJECTION INTRAVENOUS at 08:30

## 2023-01-05 RX ADMIN — LEVOTHYROXINE SODIUM 112 MCG: 0.11 TABLET ORAL at 07:57

## 2023-01-05 RX ADMIN — SODIUM CHLORIDE, POTASSIUM CHLORIDE, SODIUM LACTATE AND CALCIUM CHLORIDE: 600; 310; 30; 20 INJECTION, SOLUTION INTRAVENOUS at 02:06

## 2023-01-05 RX ADMIN — MORPHINE SULFATE 2 MG: 2 INJECTION, SOLUTION INTRAMUSCULAR; INTRAVENOUS at 03:04

## 2023-01-05 RX ADMIN — SODIUM CHLORIDE, PRESERVATIVE FREE 10 ML: 5 INJECTION INTRAVENOUS at 23:04

## 2023-01-05 RX ADMIN — ACETAMINOPHEN 500 MG: 500 TABLET ORAL at 11:37

## 2023-01-05 RX ADMIN — CITALOPRAM HYDROBROMIDE 20 MG: 20 TABLET ORAL at 08:26

## 2023-01-05 RX ADMIN — CARVEDILOL 12.5 MG: 6.25 TABLET, FILM COATED ORAL at 16:28

## 2023-01-05 RX ADMIN — HYDROCODONE BITARTRATE AND ACETAMINOPHEN 1 TABLET: 7.5; 325 TABLET ORAL at 22:36

## 2023-01-05 RX ADMIN — HYDROCODONE BITARTRATE AND ACETAMINOPHEN 1 TABLET: 7.5; 325 TABLET ORAL at 08:25

## 2023-01-05 RX ADMIN — HYDROCODONE BITARTRATE AND ACETAMINOPHEN 1 TABLET: 7.5; 325 TABLET ORAL at 16:29

## 2023-01-05 RX ADMIN — ACETAMINOPHEN 500 MG: 500 TABLET ORAL at 17:54

## 2023-01-05 RX ADMIN — PANTOPRAZOLE SODIUM 20 MG: 20 TABLET, DELAYED RELEASE ORAL at 07:56

## 2023-01-05 RX ADMIN — ACETAMINOPHEN 500 MG: 500 TABLET ORAL at 07:56

## 2023-01-05 RX ADMIN — CARVEDILOL 12.5 MG: 6.25 TABLET, FILM COATED ORAL at 08:25

## 2023-01-05 ASSESSMENT — PAIN SCALES - GENERAL
PAINLEVEL_OUTOF10: 5
PAINLEVEL_OUTOF10: 8
PAINLEVEL_OUTOF10: 6
PAINLEVEL_OUTOF10: 8
PAINLEVEL_OUTOF10: 1
PAINLEVEL_OUTOF10: 5
PAINLEVEL_OUTOF10: 5
PAINLEVEL_OUTOF10: 8

## 2023-01-05 ASSESSMENT — PAIN DESCRIPTION - ORIENTATION
ORIENTATION: LOWER
ORIENTATION: LOWER

## 2023-01-05 ASSESSMENT — PAIN DESCRIPTION - DESCRIPTORS: DESCRIPTORS: ACHING

## 2023-01-05 ASSESSMENT — ENCOUNTER SYMPTOMS
ABDOMINAL PAIN: 0
CHEST TIGHTNESS: 0
EYE REDNESS: 0
BACK PAIN: 1
COLOR CHANGE: 0

## 2023-01-05 ASSESSMENT — PAIN DESCRIPTION - LOCATION
LOCATION: BACK
LOCATION: BACK

## 2023-01-05 NOTE — CARE COORDINATION
LSW spoke with pt regarding discharge plans. Pt and wife live in a 2 story home. Pt denies having any DME or HC in the home. Pt was independent of his ADLs. Pt was still driving prior to admission. Pt has a PCP and can afford his meds. LSW was informed that pt may need rehab after discharge. Need PT/OT evals. WB note placed. Pt and wife want to think about it.  LSW will follow up in the AM.

## 2023-01-05 NOTE — DISCHARGE INSTRUCTIONS
Avoid excessive bending/twisting of lumbar spine. Wear brace whenever upright and out of bed. You can remove the brace when you are laying in bed or on the couch. Limit weight restrictions to less than 15 pounds for 3 months. Follow-up in office in 6 weeks with repeat xrays. Notify the neurosurgical office urgently if you begin to develop any numbness or tingling in extremities, weakness in extremities, or loss of bowel or bladder control.

## 2023-01-05 NOTE — ED PROVIDER NOTES
[address] ENCOUNTER      Pt Name: Joan Jennings  MRN: [id number]  Armanigfnadia 1945  Date of evaluation: 1/4/2023  Provider: Kaci Curran MD  Insurance:  Payor: Dia Mitchell / Plan: Dia Mitchell / Product Type: *No Product type* /   Current Code Status   Code Status: Prior     CHIEF COMPLAINT       Chief Complaint   Patient presents with    Fall     No LOC  Takes ASA and eliquis          HISTORY OF PRESENT ILLNESS    HPI    Nursing Notes were reviewed. This is a 76 y.o. male who presents to the emergency department with pain in his left hip and buttocks for the past 4 to 5 days after a fall at home. Patient says he fell forward at home and landed on his knees. He has had pain in his left buttocks for the past 3 to 4 days and has gotten progressively worse. Today, the patient says he is having difficulty ambulating due to the pain        PAST MEDICAL HISTORY     Past Medical History:   Diagnosis Date    Anemia     Arthritis     CAD S/P percutaneous coronary angioplasty 8/13/2012    Anamalous RCA, Stent in Diagonal    GERD (gastroesophageal reflux disease)     Ludivina filter in place     H/O cardiac catheterization 8/9/12    H/O echocardiogram     3/13 Mild MR/TR, 7/12 mildly reduced LVS with inferolateral wall hypokinesis, mildly reduced LV EF 35%, mild lt ventricular dilatation, mod lt atrial enlargement, mild mitral regurg, mild tricuspid regurg    H/O exercise stress test 8/28/12    normal study. EF 48%, exercise 4.6 METS    History of complete ECG 8/22/12    Hx of blood clots     lt  arm    Hx of echocardiogram 07/01/2015    EF 45-50% Trace MR and TR, mild pulm. htn.      Hypercoagulable state ([address]     Hyperlipidemia     Hypertension     Pulmonary embolism ([address] 1980    RBBB (right bundle branch block with left anterior fascicular block)     Renal insufficiency 9/19/2018    Creatinine 1.3, 8/2018    Thyroid disease Venous insufficiency          SURGICAL HISTORY       Past Surgical History:   Procedure Laterality Date    CORONARY ANGIOPLASTY WITH STENT PLACEMENT  8/13/12    diag stented    KNEE SURGERY      LEG TENDON SURGERY      TONSILLECTOMY      TUMOR REMOVAL           CURRENT MEDICATIONS       Previous Medications    ACETAMINOPHEN (TYLENOL) 500 MG TABLET    Take 500 mg by mouth every 6 hours as needed for Pain    ALENDRONATE (FOSAMAX) 70 MG TABLET    Take 70 mg by mouth every 7 days    APIXABAN (ELIQUIS STARTER PACK) 5 MG TABS TABLET    Take 10 mg (2 tablets) orally twice daily for 7 days, then take 5 mg (1 tablet) orally twice daily thereafter. ASPIRIN 81 MG EC TABLET    Take 81 mg by mouth daily. ATORVASTATIN (LIPITOR) 80 MG TABLET    Take 80 mg by mouth daily     CALCIUM CARBONATE (TUMS EX) 750 MG CHEWABLE TABLET    Take 1 tablet by mouth 2 times daily     CARVEDILOL (COREG) 12.5 MG TABLET    Take 12.5 mg by mouth 2 times daily (with meals). CITALOPRAM (CELEXA) 40 MG TABLET        IMIPRAMINE (TOFRANIL) 25 MG TABLET    50 mg nightly     LEVOTHYROXINE (SYNTHROID) 112 MCG TABLET    Take 112 mcg by mouth daily. NITROGLYCERIN (NITROSTAT) 0.4 MG SL TABLET    Place 0.4 mg under the tongue every 5 minutes as needed.       PANTOPRAZOLE (PROTONIX) 40 MG TABLET      Take 20 mg by mouth daily     PSYLLIUM (KONSYL) 28.3 % PACK    Take 1 packet by mouth daily    TAMSULOSIN (FLOMAX) 0.4 MG CAPSULE           ALLERGIES     Bee venom, Plavix [clopidogrel], and Poison ivy treatment [benzyl alcohol-camphor-menthol]    FAMILY HISTORY       Family History   Problem Relation Age of Onset    Heart Disease Mother     Stroke Mother     Heart Disease Brother     Cancer Father     Heart Disease Maternal Uncle           SOCIAL HISTORY       Social History     Socioeconomic History    Marital status:      Spouse name: None    Number of children: None    Years of education: None    Highest education level: None   Tobacco Use Smoking status: Former    Smokeless tobacco: Never    Tobacco comments:     reviewed 03/09/16   Vaping Use    Vaping Use: Never used   Substance and Sexual Activity    Alcohol use: Not Currently     Comment: caffeine 2 cups of coffee a day    Drug use: No    Sexual activity: Yes     Partners: Female       PHYSICAL EXAM       ED Triage Vitals [01/04/23 1739]   BP Temp Temp src Heart Rate Resp SpO2 Height Weight   (!) 145/93 -- -- 88 17 97 % -- 170 lb (77.1 kg)       Physical Exam  Vitals and nursing note reviewed. Constitutional:       Appearance: Normal appearance. He is not diaphoretic. HENT:      Head: Normocephalic and atraumatic. Eyes:      Extraocular Movements: Extraocular movements intact. Cardiovascular:      Rate and Rhythm: Normal rate and regular rhythm. Heart sounds: Normal heart sounds. Pulmonary:      Effort: Pulmonary effort is normal.      Breath sounds: Normal breath sounds. Abdominal:      Palpations: Abdomen is soft. Tenderness: There is no abdominal tenderness. Musculoskeletal:      Lumbar back: Negative right straight leg raise test and negative left straight leg raise test.        Legs:    Skin:     General: Skin is warm and dry. Neurological:      General: No focal deficit present. Mental Status: He is alert and oriented to person, place, and time. Vitals:    Vitals:    01/04/23 1739 01/04/23 1922 01/04/23 2349   BP: (!) 145/93 (!) 169/89    Pulse: 88 87 77   Resp: 17 17 15   SpO2: 97% 99% 100%   Weight: 170 lb (77.1 kg) 172 lb (78 kg)    Height:  5' 8\" (1.727 m)        DIAGNOSTIC RESULTS   ECG: Interpreted by me. Sinus rhythm. First-degree AV block. Ventricular rate of 80. IL is 238. QRS is 174. QTc is 512. There are no abnormal ST elevations or depressions. There is a right bundle branch block morphology.   There is no significant change from prior EKG from September 2021    RADIOLOGY:   Non-plain film images such as CT, Ultrasound and MRI are read by the radiologist. Plain radiographic images are visualized and preliminarily interpreted by the emergency physician with the below findings:    Interpretation per the Radiologist below, if available at the time of this note:    Ute   Final Result   1. Acute to subacute appearing severe compression deformity of the L5   vertebral body with approximately 75% height loss and 6 mm retropulsion. 2. Acute to subacute appearing nondisplaced fracture of the sacrum centered   at the S2 level. 3. Remote/chronic appearing compression deformities of the T12, L1, and L2   vertebral bodies. 4. Osteopenia. XR HIP 2-3 VW W PELVIS LEFT   Final Result   No acute osseous abnormality. LABS:  Labs Reviewed - No data to display    All other labs were within normal range or not returned as of this dictation. MEDICAL DECISION MAKING     Medications   HYDROcodone-acetaminophen (NORCO) 5-325 MG per tablet 2 tablet (2 tablets Oral Given 1/4/23 1847)             Suzette Coma Scale  Eye Opening: Spontaneous  Best Verbal Response: Oriented  Best Motor Response: Obeys commands  Suzette Coma Scale Score: 15                     CIWA Assessment  BP: (!) 169/89  Heart Rate: 68                 MDM  This is a 76 y.o. male who presents to the emergency department with pain in his left hip and buttocks for the past 4 to 5 days after a fall at home. Patient says he fell forward at home and landed on his knees. He has had pain in his left buttocks for the past 3 to 4 days and has gotten progressively worse. Today, the patient says he is having difficulty ambulating due to the pain    He denies pain radiating across the left buttocks but no pain on the lateral aspect of the hip. Patient said he was able to ambulate after the event, however he has had increasing difficulty and today he is unable to stand on the leg without severe pain. He denies lower back pain. He denies groin pain.   He denies dysuria or frequency. He denies any other injury. Denies striking his head. Patient has no lumbar tenderness, however his pain is consistent with radiculopathy, which may be traumatic in etiology and as result requires imaging of the lumbar spine. Lumbar spine CT demonstrates a compression fracture of L5 with retropulsion. Additionally, there is a nondisplaced sacral fracture in the region of S2. The patient states that he did have a previous spinal fracture however CT imaging indicates that this particular injury is acute and likely occurred in the past few days. Patient has an acute fracture of L5  The patient has an acute fracture of the sacrum at S2  He is unable to ambulate due to pain. The patient was discussed with neurosurgery and orthopedics and they have recommended admission if the patient is unable to ambulate and has severe pain and unable to ambulate at this time. The patient would likely require inpatient physical therapy placement. Clinical Diagnoses Addressed  1. Closed compression fracture of L5 lumbar vertebra, initial encounter ([address]    2. Closed fracture of sacrum, unspecified portion of sacrum, initial encounter ([address]        . CONSULTS:  IP CONSULT TO NEUROSURGERY  IP CONSULT TO ORTHOPEDIC SURGERY    PROCEDURES:  Unless otherwise noted below, none     Procedures      FINAL IMPRESSION      1. Closed compression fracture of L5 lumbar vertebra, initial encounter ([address]    2. Closed fracture of sacrum, unspecified portion of sacrum, initial encounter [facility])          DISPOSITION/PLAN   DISPOSITION Decision To Admit 01/04/2023 11:47:31 PM      PATIENT REFERRED TO:  No follow-up provider specified. DISCHARGE MEDICATIONS:  New Prescriptions    No medications on file     Controlled Substances Monitoring:     No flowsheet data found.     Rolando Sierra MD (electronically signed)  Attending Emergency Physician            Rolando Sierra MD  01/04/23 0860

## 2023-01-05 NOTE — ED NOTES
ED TO INPATIENT SBAR HANDOFF    Patient Name: Luther Gabriel   :  1945  76 y.o. MRN:  [id number]  Preferred Name  [facility]  ED Room #:  ED16/ED-16  Family/Caregiver Present yes   Restraints no   Sitter no   Sepsis Risk Score Sepsis Risk Score: 1.4    Situation  Code Status: Prior No additional code details. Allergies: Bee venom, Plavix [clopidogrel], and Poison ivy treatment [benzyl alcohol-camphor-menthol]  Weight:   Patient Vitals for the past 96 hrs (Last 3 readings):   Weight   23 1922 172 lb (78 kg)   23 1739 170 lb (77.1 kg)     Arrived from: home  Chief Complaint:   Chief Complaint   Patient presents with    Fall     No LOC  Takes [facility] and eliquis      Hospital Problem/Diagnosis:  Principal Problem:    Closed compression fracture of L5 lumbar vertebra, initial encounter ([facility]  Resolved Problems:    * No resolved hospital problems. *    Imaging:   CT LUMBAR SPINE WO CONTRAST   Final Result   1. Acute to subacute appearing severe compression deformity of the L5   vertebral body with approximately 75% height loss and 6 mm retropulsion. 2. Acute to subacute appearing nondisplaced fracture of the sacrum centered   at the S2 level. 3. Remote/chronic appearing compression deformities of the T12, L1, and L2   vertebral bodies. 4. Osteopenia. XR HIP 2-3 VW W PELVIS LEFT   Final Result   No acute osseous abnormality.            Abnormal labs: Abnormal Labs Reviewed - No data to display  Critical values: no     Abnormal Assessment Findings:     Background  History:   Past Medical History:   Diagnosis Date    Anemia     Arthritis     CAD S/P percutaneous coronary angioplasty 2012    Anamalous RCA, Stent in Diagonal    GERD (gastroesophageal reflux disease)     Ludivina filter in place     H/O cardiac catheterization 12    H/O echocardiogram     3/13 Mild MR/TR,  mildly reduced LVS with inferolateral wall hypokinesis, mildly reduced LV EF 35%, mild lt ventricular dilatation, mod lt atrial enlargement, mild mitral regurg, mild tricuspid regurg    H/O exercise stress test 8/28/12    normal study. EF 48%, exercise 4.6 METS    History of complete ECG 8/22/12    Hx of blood clots     lt  arm    Hx of echocardiogram 07/01/2015    EF 45-50% Trace MR and TR, mild pulm. htn.  Hyperco[facility] ([facility]     Hyperlipidemia     Hypertension     Pulmonary embolism ([facility] 1980    RBBB (right bundle branch block with left anterior fascicular block)     Renal insufficiency 9/19/2018    Creatinine 1.3, 8/2018    Thyroid disease     Venous insufficiency        Assessment    Vitals/MEWS: MEWS Score: 0  Level of Consciousness: Alert (0)   Vitals:    01/04/23 1739 01/04/23 1922 01/04/23 2349 01/05/23 0030   BP: (!) 145/93 (!) 169/89  (!) 172/80   Pulse: 88 87 77 73   Resp: 17 17 15 12   Temp:    98.5 °F (36.9 °C)   SpO2: 97% 99% 100% 100%   Weight: 170 lb (77.1 kg) 172 lb (78 kg)     Height:  5' 8\" (1.727 m)       FiO2 (%):   O2 Flow Rate: O2 Device: Nasal cannula O2 Flow Rate (L/min): 4 L/min  Cardiac Rhythm:    Pain Assessment: 6 [x] Verbal [] Wandra Pardon Scale  Pain Scale: Pain Assessment  Pain Assessment: 0-10  Pain Level: 6  Last documented pain score (0-10 scale) Pain Level: 6  Last documented pain medication administered: 1847  Mental Status: oriented, alert, coherent, logical, thought processes intact, and able to concentrate and follow conversation  NIH Score:    C-SSRS: Risk of Suicide: No Risk  Bedside swallow:    Gheens Coma Scale (GCS): Suzette Coma Scale  Eye Opening: Spontaneous  Best Verbal Response: Oriented  Best Motor Response: Obeys commands  Gheens Coma Scale Score: 15  Active LDA's:    PO Status: Nothing by Mouth  Pertinent or High Risk Medications/Drips: no   o If Yes, please provide details:   Pending Blood Product Administration: no     You may also review the ED PT Care Timeline found under the Summary Nursing Index tab.     Recommendation    Pending orders   Plan for Discharge (if known):    Additional Comments:    If any further questions, please call Sending RN at [phone]    Electronically signed by: Electronically signed by Tram Bailey RN on 1/5/2023 at 12:40 AM       Nisreen Moran RN  01/04/23 [id number] Munira MIRELES RN  01/05/23 0040

## 2023-01-05 NOTE — PROGRESS NOTES
Patient seen and examined at bedside this morning. Reports lower back pain with radiation to the lower extremities. Patient was admitted earlier today by Dr. Zaira Rawls after he presented to the hospital for worsening lower back pain and left hip pain status post a fall episode at home a couple of days ago. Patient was evaluated by neurosurgery and they recommended LSO brace at this time. PT OT pending. Orthopedic consult also pending.   We will continue current management plan

## 2023-01-05 NOTE — CONSULTS
Neurosurgery   Consult Note      Reason for Consult: L5 fracture with retropulsion  Consulting Physician: Virginia Cárdenas MD  Attending Physician:  Santiago Ladd MD  Date of Admission: 1/4/2023  Subjective:   CHIEF COMPLAINT: S/P fall, left buttock pain, difficulty with ambulation    HPI:  76 y.o. 1945  Who presented to the ED 1/4/23 with complaints of progressively worsening low back and left buttock pain after a fall sustained 4-5 days prior to presentation. He states that he was walking into his bathroom when he stumbled, landing on his left knee and then his left hip. He was able to get off of the ground and ambulate. He noticed this past Sunday that he was developing worsening back pain and left hip pain. IT progressed to where yesterday he had difficulty ambulating and had to crawl on the floor to get to the bathroom. His CT in the ER shows an L5 burst fracture with 6mm of retropulsion. He tells me he has a history of previous spine fractures and on imaging has a chronic appearing T12, L1 and L2 fracture. He is laying in bed and appears comfortable. He does experience shooting pain in his low back with examination. He denies any saddle paresthesias, N/T in lower extremities, or bowel/bladder incontinence. Patient is on ASA and eliquis at baseline. PMHx positive for CAD, anemia, HTN, HLD, Hx of PE, hypothyroidism. PSHx positive for coronary angioplasty with stent placement, tonsillectomy.                    Past Medical and Surgical History:       Diagnosis Date    Anemia     Arthritis     CAD S/P percutaneous coronary angioplasty 8/13/2012    Anamalous RCA, Stent in Diagonal    GERD (gastroesophageal reflux disease)     Ludivina filter in place     H/O cardiac catheterization 8/9/12    H/O echocardiogram     3/13 Mild MR/TR, 7/12 mildly reduced LVS with inferolateral wall hypokinesis, mildly reduced LV EF 35%, mild lt ventricular dilatation, mod lt atrial enlargement, mild mitral regurg, mild tricuspid regurg    H/O exercise stress test 8/28/12    normal study. EF 48%, exercise 4.6 METS    History of complete ECG 8/22/12    Hx of blood clots     lt  arm    Hx of echocardiogram 07/01/2015    EF 45-50% Trace MR and TR, mild pulm. htn. Hypercoagulable state ([facility]     Hyperlipidemia     Hypertension     Pulmonary embolism ([facility] 1980    RBBB (right bundle branch block with left anterior fascicular block)     Renal insufficiency 9/19/2018    Creatinine 1.3, 8/2018    Thyroid disease     Venous insufficiency          Procedure Laterality Date    CORONARY ANGIOPLASTY WITH STENT PLACEMENT  8/13/12    diag stented    KNEE SURGERY      LEG TENDON SURGERY      TONSILLECTOMY      TUMOR REMOVAL         Social History:    TOBACCO:   reports that he has quit smoking. He has never used smokeless tobacco.  ETOH:   reports that he does not currently use alcohol.     Family History:       Problem Relation Age of Onset    Heart Disease Mother     Stroke Mother     Heart Disease Brother     Cancer Father     Heart Disease Maternal Uncle        Current Medications:    Current Facility-Administered Medications: acetaminophen (TYLENOL) tablet 500 mg, 500 mg, Oral, Q6H  atorvastatin (LIPITOR) tablet 80 mg, 80 mg, Oral, Daily  carvedilol (COREG) tablet 12.5 mg, 12.5 mg, Oral, BID WC  citalopram (CELEXA) tablet 20 mg, 20 mg, Oral, Daily  levothyroxine (SYNTHROID) tablet 112 mcg, 112 mcg, Oral, Daily  pantoprazole (PROTONIX) tablet 20 mg, 20 mg, Oral, Daily  tamsulosin (FLOMAX) capsule 0.4 mg, 0.4 mg, Oral, Daily  lactated ringers infusion, , IntraVENous, Continuous  sodium chloride flush 0.9 % injection 5-40 mL, 5-40 mL, IntraVENous, 2 times per day  sodium chloride flush 0.9 % injection 5-40 mL, 5-40 mL, IntraVENous, PRN  0.9 % sodium chloride infusion, , IntraVENous, PRN  ondansetron (ZOFRAN-ODT) disintegrating tablet 4 mg, 4 mg, Oral, Q8H PRN **OR** ondansetron (ZOFRAN) injection 4 mg, 4 mg, IntraVENous, Q6H PRN  polyethylene glycol (GLYCOLAX) packet 17 g, 17 g, Oral, Daily  HYDROcodone-acetaminophen (NORCO) 7.5-325 MG per tablet 1 tablet, 1 tablet, Oral, Q6H PRN    Allergies   Allergen Reactions    Bee Venom Swelling    Plavix [Clopidogrel]      Skin rash      Poison Ivy Treatment [Benzyl Alcohol-Camphor-Menthol]         REVIEW OF SYSTEMS:    CONSTITUTIONAL:  negative for fevers, chills, diaphoresis, activity change, appetite change, fatigue, night sweats   EYES:  negative for blurred vision, eye discharge, visual disturbance and icterus  HEENT:  negative for hearing loss, tinnitus, ear drainage, sinus pressure, nasal congestion, epistaxis and snoring  RESPIRATORY:  No cough, shortness of breath, hemoptysis  GASTROINTESTINAL:  negative for nausea, vomiting, diarrhea, constipation, blood in stool and abdominal pain  GENITOURINARY:  negative for frequency, dysuria, urinary incontinence, decreased urine volume, and hematuria  HEMATOLOGIC/LYMPHATIC:  negative for easy bruising, bleeding and lymphadenopathy  MUSCULOSKELETAL: positive for back pain, negative for joint swelling, decreased range of motion and muscle weakness  NEUROLOGICAL:  negative for headaches, slurred speech, unilateral weakness  PSYCHIATRIC/BEHAVIORAL: negative for hallucinations, behavioral problems, confusion and agitation. Objective:   PHYSICAL EXAM:      VITALS:  BP (!) 150/77   Pulse 71   Temp 97.4 °F (36.3 °C) (Oral)   Resp 16   Ht 5' 8\" (1.727 m)   Wt 175 lb 14.8 oz (79.8 kg)   SpO2 100%   BMI 26.75 kg/m²      24HR INTAKE/OUTPUT:    Intake/Output Summary (Last 24 hours) at 1/5/2023 7251  Last data filed at 1/5/2023 0305  Gross per 24 hour   Intake --   Output 230 ml   Net -230 ml     CONSTITUTIONAL:  Awake, alert, cooperative, no apparent distress, and appears stated age  [de-identified]: NCAT, PERRL, EOMI. Sclera white, conjunctive full. NECK:  Supple, symmetrical, trachea midline, no adenopathy  PSYCHIATRIC: Oriented to person place and time.  No obvious depression or anxiety. MUSCULOSKELETAL: No obvious misalignment or effusion of the joints. No clubbing, cyanosis of the digits. SKIN:  normal skin color, texture, turgor and no redness, warmth, or swelling. NEUROLOGIC: Alert and oriented x4, face symmetrical, no obvious droop, speech clear and coherent, sensation intact in lower extremities  Upper extremity strength: bilateral upper extremities able to resist gravity  Lower extremity strength: bilateral lower extremities 5/5 throughout    DATA:    Old records have been reviewed    CBC:  Recent Labs     01/05/23  0431   WBC 7.1   RBC 4.20*   HGB 10.8*   HCT 35.0*      MCV 83.3   MCH 25.7*   MCHC 30.9*   RDW 16.3*   SEGSPCT 51.4      BMP:  Recent Labs     01/05/23  0431      K 4.3      CO2 25   BUN 16   CREATININE 1.0   CALCIUM 8.9   GLUCOSE 84     Radiology Review:  All pertinent images / reports were reviewed as a part of this visit. CT lumbar spine 1/4/23  Impression   1. Acute to subacute appearing severe compression deformity of the L5   vertebral body with approximately 75% height loss and 6 mm retropulsion. 2. Acute to subacute appearing nondisplaced fracture of the sacrum centered   at the S2 level. 3. Remote/chronic appearing compression deformities of the T12, L1, and L2   vertebral bodies. 4. Osteopenia. Assessment:   Acute to subacute L5 burst fracture with retropulsion, 6mm  Acute to subacute S2 fracture   Chronic T12, L1, and L2 compression fractures  Osteopenia  CAD s/p PCI  Hx of PE  Plan:   Patient who presents to the ER 1/4/23 with complaints of progressively worsening low back pain and left hip pain. Patient fell while in the bathroom roughly 5 days ago. Pain has progressed to where he has difficulty with ambulation. He was found to have an acute to subacute L5 burst fracture with 6mm of posterior retropulsion. He has chronic T12, L1, and L2 compression deformities.  He has no radicular pain, no saddle paresthesias, no bowel/bladder incontinence, and no lower extremity weakness or N/T. He will be treated with an LSO brace. He should wear this when upright and out of bed. Standing lumbar xrays to be obtained once brace is obtained, [facility] has been contacted. PT/OT may see the patient once he has his brace. He will need to see me in 6 weeks with repeat xrays. Orthopedics has been consulted for his S2 fracture. He does have osteopenia on his images, he will need to follow-up with his PCP to be initiated on osteopenia medications. Electronically signed by Aspen Gonzalez PA-C on 1/5/2023 at 7:57 AM    Supervising physician: Heidy Webber MD.  Dr. Bijan Webber was readily and continuously available by phone for direct consultation regarding the care of this patient. Time spent with patient in consultation, education, and collaboration with medical time is >50% of total time spent on case, including time spent in chart review and dictation. Total time spent: 40 minutes    Thank you for the opportunity to participate in the care of your patient.

## 2023-01-05 NOTE — CARE COORDINATION
LSO brace ordered from [facility]. Brace to be worn when upright and out bed.

## 2023-01-05 NOTE — PROGRESS NOTES
Physician Progress Note      PATIENT:               Evelyn Quintana  CSN #:                  [id number]  :                       1945  ADMIT DATE:       2023 5:30 PM  [address] [place] DATE:  RESPONDING  PROVIDER #:        Ginger Ely MD          QUERY TEXT:    Pt admitted with L5 burst fracture. Pt noted to have osteopenia. If possible,   please document in progress notes and discharge summary if you are evaluating   and/or treating any of the following: The medical record reflects the following:  Risk Factors:  Osteopenia, fall  Clinical Indicators: Neurosurgery consult note \"stumbled, landing on his left   knee and then his left hip. ........ osteopenia\",  Treatment: L-spine CT, neurosurgery consult, LSO brace. Thank you,  LEODAN PerezN, RN, [facility]  [phone]  Options provided:  -- Pathological L5 burst fracture due to osteopenia following fall which would   not usually break a normal, healthy bone  -- Traumatic L5 burst fracture  -- Other - I will add my own diagnosis  -- Disagree - Not applicable / Not valid  -- Disagree - Clinically unable to determine / Unknown  -- Refer to Clinical Documentation Reviewer    PROVIDER RESPONSE TEXT:    This patient has a pathological L5 burst fracture due to osteopenia following   fall which would not usually break a normal, healthy bone.     Query created by: Blank Olivares on 2023 10:49 AM      Electronically signed by:  Ginger Ely MD 2023 10:54 AM

## 2023-01-05 NOTE — ED NOTES
@7500 Called [facility] per Dr Elizabeth Nunez Samples Given: DHS shampoos given, rotate shampoos Detail Level: Zone

## 2023-01-05 NOTE — PROGRESS NOTES
4 Eyes Skin Assessment     NAME:  Shanika Moncada OF BIRTH:  1945  MEDICAL RECORD NUMBER:  [id number]    The patient is being assessed for  Admission    I agree that One RN have performed a thorough Head to Toe Skin Assessment on the patient. ALL assessment sites listed below have been assessed. Areas assessed by both nurses:    Head, Face, Ears, Shoulders, Back, Chest, Arms, Elbows, Hands, Sacrum. Buttock, Coccyx, Ischium, and Legs. Feet and Heels        Does the Patient have a Wound?  No noted wound(s)       Chas Prevention initiated by RN: No   Wound Care Orders initiated by RN: No    Pressure Injury (Stage 3,4, Unstageable, DTI, NWPT, and Complex wounds) if present place referral order by RN under : No    New and Established Ostomies, if present place, referral order under : No      Nurse 1 eSignature: Electronically signed by Ellen Tim RN on 1/5/23 at 6:40 AM EST    **SHARE this note so that the co-signing nurse is able to place an eSignature**    Nurse 2 eSignature: Electronically signed by Karen Chahal RN on 1/5/23 at 6:40 AM EST

## 2023-01-05 NOTE — PROGRESS NOTES
This RN with  Parul Lombardo attempted to assist patient to stand with x2 assist and gait belt: Pt unable to bear weight on L leg, unable to sit upright in bed d/t pain. PT assisted to log roll back into bed.

## 2023-01-05 NOTE — H&P
History and Physical      Name:  Breanna Polo /Age/Sex: 1945  (76 y.o. male)   MRN & CSN:  [id number] & [id number] Encounter Date/Time: 2023 12:05 AM EST   Location:  ED16/ED-16 PCP: Dawson Gallo MD       Hospital Day: 2    Assessment and Plan:     Patient is a 58-year-old male who presented with fall with hip pain. # Acute severe L5 and S2 fractures  - Presented after a mechanical fall at home x4 days prior leading to left hip and buttock pain. Had difficulty ambulating as well, but no B/B incontinence or loss of strength. - CT L-spine showing acute-to-subacute severe compression deformity of L5 with 75% height loss and 6 mm retropulsion, and acute-to-subacute nondisplaced S2 fracture. - ED provider discussed with NSGY and Ortho, follow-up. NPO.   - Supportive care with mIVF and analgesia. NPO. Held home Eliquis for possible procedure. # CAD s/p PCI in 2012  - Hold ASA for possibly surgical intervention. Continue Lipitor. # Essential HTN  - Continue home Coreg. # Hx of protein C deficiency with bilateral BEs   - Was previously on Coumadin, which he failed, thus transitioned to Eliquis, hold for possible surgical intervention. # GERD  - Continue Protonix. # Acquired hypothyroidism  - Continue Synthroid. Checklist:  Advanced directive: full  Antibiotics: none  Catheter: none  DVT ppx: none for possible surgical intervention   Electrolytes: labs ordered  Nutrition/Diet: NPO  PT/OT:  ordered    Disposition: patient requires continued admission due to acute L5 and S2 fractures. MDM: moderate. History of Present Illness:     Chief Complaint: left hip pain    Patient is a 38-year-old male with a PMHx of CAD s/p PCI in 2012, protein C deficiency leading to bilateral BEs (on lifelong DOAC), HTN, GERD and hypothyroidism who presented to the ED after a mechanical fall at home x4 days prior leading to left hip and buttock pain. Fall happened after he fell forward.  Had difficulty ambulating as well, but no B/B incontinence or loss of strength. No head trauma, LOC or bleeding. No other complaints. ROS:     Ten point ROS reviewed negative, unless as noted above. Objective:     No intake or output data in the 24 hours ending 01/05/23 0005     Vitals:   Vitals:    01/04/23 1739 01/04/23 1922 01/04/23 2349   BP: (!) 145/93 (!) 169/89    Pulse: 88 87 77   Resp: 17 17 15   SpO2: 97% 99% 100%   Weight: 170 lb (77.1 kg) 172 lb (78 kg)    Height:  5' 8\" (1.727 m)      BMI: Body mass index is 26.15 kg/m². General: Awake. WDWN. AAOx3. HEENT: PERRLA. Vision grossly intact. Hearing grossly intact. Oropharynx clear. Neck: Supple. No JVD. CV: RRR. NL S1/S2. No murmurs. CR <2 secs. No peripheral edema. Pulm: NL effort on RA. CTAB. CW NTTP. GI: +BS x4. Soft. NT/ND. : No CVA or suprapubic tenderness. No Carmona catheter. Skin: Intact. Normal coloration, warm, dry. MSK: No gross joint deformities. Tenderness over L/S spine. Neuro: CNs grossly intact, face symmetrical, no obvious droop, speech clear and coherent, rapid and repetitive movements are intact, sensation intact to light touch and pinprick sensation. No focal deficit. Bilateral upper and lower extremities 5/5 throughout. Negative SLR bilaterally. Psych: Good judgement and reason. Past History: PMHx:   Past Medical History:   Diagnosis Date    Anemia     Arthritis     CAD S/P percutaneous coronary angioplasty 8/13/2012    [place] RCA, Stent in Diagonal    GERD (gastroesophageal reflux disease)     [place] filter in place     H/O cardiac catheterization 8/9/12    H/O echocardiogram     3/13 Mild MR/TR, 7/12 mildly reduced LVS with inferolateral wall hypokinesis, mildly reduced LV EF 35%, mild lt ventricular dilatation, mod lt atrial enlargement, mild mitral regurg, mild tricuspid regurg    H/O exercise stress test 8/28/12    normal study.  EF 48%, exercise 4.6 METS    History of complete ECG 8/22/12    Hx of blood clots     lt  arm    Hx of echocardiogram 07/01/2015    EF 45-50% Trace MR and TR, mild pulm. htn. Hypercoagulable state ([facility] [address]     Hyperlipidemia     Hypertension     Pulmonary embolism ([facility] [address] 1980    RBBB (right bundle branch block with left anterior fascicular block)     Renal insufficiency 9/19/2018    Creatinine 1.3, 8/2018    Thyroid disease     Venous insufficiency        PSHx:   Past Surgical History:   Procedure Laterality Date    CORONARY ANGIOPLASTY WITH STENT PLACEMENT  8/13/12    diag stented    KNEE SURGERY      LEG TENDON SURGERY      TONSILLECTOMY      TUMOR REMOVAL         Allergies: Allergies   Allergen Reactions    Bee Venom Swelling    Plavix [Clopidogrel]      Skin rash      Poison Ivy Treatment [Benzyl Alcohol-Camphor-Menthol]        FHx: family history includes Cancer in his father; Heart Disease in his brother, maternal uncle, and mother; Stroke in his mother. SHx:   Social History     Socioeconomic History    Marital status:      Spouse name: None    Number of children: None    Years of education: None    Highest education level: None   Tobacco Use    Smoking status: Former    Smokeless tobacco: Never    Tobacco comments:     reviewed 03/09/16   Vaping Use    Vaping Use: Never used   Substance and Sexual Activity    Alcohol use: Not Currently     Comment: caffeine 2 cups of coffee a day    Drug use: No    Sexual activity: Yes     Partners: Female       Medications Prior to Admission     Prior to Admission medications    Medication Sig Start Date End Date Taking? Authorizing Provider   apixaban (ELIQUIS STARTER PACK) 5 MG TABS tablet Take 10 mg (2 tablets) orally twice daily for 7 days, then take 5 mg (1 tablet) orally twice daily thereafter.  1/6/20   Anatoly Rivas MD   psyllium (KONSYL) 28.3 % PACK Take 1 packet by mouth daily  Patient not taking: Reported on 9/29/2022    Historical Provider, MD   acetaminophen (TYLENOL) 500 MG tablet Take 500 mg by mouth every 6 hours as needed for Pain    Historical Provider, MD   citalopram (CELEXA) 40 MG tablet  8/23/18   Historical Provider, MD   imipramine (TOFRANIL) 25 MG tablet 50 mg nightly  9/8/17   Historical Provider, MD   tamsulosin (FLOMAX) 0.4 MG capsule  8/28/17   Historical Provider, MD   alendronate (FOSAMAX) 70 MG tablet Take 70 mg by mouth every 7 days    Historical Provider, MD   calcium carbonate (TUMS EX) 750 MG chewable tablet Take 1 tablet by mouth 2 times daily     Historical Provider, MD   atorvastatin (LIPITOR) 80 MG tablet Take 80 mg by mouth daily  8/14/14   Historical Provider, MD   carvedilol (COREG) 12.5 MG tablet Take 12.5 mg by mouth 2 times daily (with meals). Historical Provider, MD   pantoprazole (PROTONIX) 40 MG tablet   Take 20 mg by mouth daily     Historical Provider, MD   aspirin 81 MG EC tablet Take 81 mg by mouth daily. Historical Provider, MD   levothyroxine (SYNTHROID) 112 MCG tablet Take 112 mcg by mouth daily. Historical Provider, MD   nitroGLYCERIN (NITROSTAT) 0.4 MG SL tablet Place 0.4 mg under the tongue every 5 minutes as needed. Historical Provider, MD       Data:     CBC: No results for input(s): WBC, HGB, PLT, MCV, RDW, BANDSPCT, BLASTSPCT, METASPCT, LYMPHOPCT, PROMYELOPCT, MONOPCT, MYELOPCT, EOSPCT, BASOPCT, MONOSABS, LYMPHSABS, EOSABS, BASOSABS in the last 72 hours. Invalid input(s): SEGSPCTL, ATYLMREL  CMP:  No results for input(s): NA, K, CL, CO2, BUN, CREATININE, GFRAA, GLUCOSE, LABALBU, CALCIUM, BILITOT, ALKPHOS, AST, ALT in the last 72 hours.   Lipids:   Lab Results   Component Value Date/Time    CHOL 112 09/15/2022 12:00 AM    HDL 52 09/15/2022 12:00 AM    TRIG 49 09/15/2022 12:00 AM     Hemoglobin A1C:   Lab Results   Component Value Date/Time    LABA1C 6.1 07/01/2015 09:53 AM     TSH:   Lab Results   Component Value Date/Time    TSH 3.320 09/09/2020 12:00 AM     Troponin:   Lab Results   Component Value Date/Time    TROPONINT <0.010 01/04/2020 02:20 PM TROPONINT <0.010 07/01/2015 11:00 PM    TROPONINT <0.010 07/01/2015 03:51 PM     BNP: No results for input(s): PROBNP in the last 72 hours. Lactic Acid: No results for input(s): LACTA in the last 72 hours. UA:No results found for: Carly Cheeks, PHUR, LABCAST, WBCUA, RBCUA, MUCUS, TRICHOMONAS, YEAST, BACTERIA, CLARITYU, SPECGRAV, LEUKOCYTESUR, UROBILINOGEN, BILIRUBINUR, BLOODU, GLUCOSEU, KETUA, AMORPHOUS  Urine Cultures: No results found for: LABURIN  Blood Cultures: No results found for: BC  No results found for: BLOODCULT2  Organism: No results found for: [facility]    Radiology results:  CT LUMBAR SPINE WO CONTRAST   Final Result   1. Acute to subacute appearing severe compression deformity of the L5   vertebral body with approximately 75% height loss and 6 mm retropulsion. 2. Acute to subacute appearing nondisplaced fracture of the sacrum centered   at the S2 level. 3. Remote/chronic appearing compression deformities of the T12, L1, and L2   vertebral bodies. 4. Osteopenia. XR HIP 2-3 VW W PELVIS LEFT   Final Result   No acute osseous abnormality.              Medications:     Medications:        Infusions:       PRN Meds:       Fani Guan MD  01/05/23 12:05 AM

## 2023-01-06 ENCOUNTER — APPOINTMENT (OUTPATIENT)
Dept: GENERAL RADIOLOGY | Age: 78
DRG: 543 | End: 2023-01-06
Payer: COMMERCIAL

## 2023-01-06 ENCOUNTER — TELEPHONE (OUTPATIENT)
Dept: NEUROSURGERY | Age: 78
End: 2023-01-06

## 2023-01-06 DIAGNOSIS — S32.050A CLOSED COMPRESSION FRACTURE OF L5 LUMBAR VERTEBRA, INITIAL ENCOUNTER (HCC): Primary | ICD-10-CM

## 2023-01-06 LAB
ANION GAP SERPL CALCULATED.3IONS-SCNC: 9 MMOL/L (ref 4–16)
BUN BLDV-MCNC: 17 MG/DL (ref 6–23)
CALCIUM SERPL-MCNC: 8.6 MG/DL (ref 8.3–10.6)
CHLORIDE BLD-SCNC: 98 MMOL/L (ref 99–110)
CO2: 25 MMOL/L (ref 21–32)
CREAT SERPL-MCNC: 0.9 MG/DL (ref 0.9–1.3)
GFR SERPL CREATININE-BSD FRML MDRD: >60 ML/MIN/1.73M2
GLUCOSE BLD-MCNC: 108 MG/DL (ref 70–99)
HCT VFR BLD CALC: 35.5 % (ref 42–52)
HEMOGLOBIN: 11.2 GM/DL (ref 13.5–18)
MCH RBC QN AUTO: 26.2 PG (ref 27–31)
MCHC RBC AUTO-ENTMCNC: 31.5 % (ref 32–36)
MCV RBC AUTO: 82.9 FL (ref 78–100)
PDW BLD-RTO: 16.1 % (ref 11.7–14.9)
PLATELET # BLD: 284 K/CU MM (ref 140–440)
PMV BLD AUTO: 10 FL (ref 7.5–11.1)
POTASSIUM SERPL-SCNC: 4.7 MMOL/L (ref 3.5–5.1)
RBC # BLD: 4.28 M/CU MM (ref 4.6–6.2)
SODIUM BLD-SCNC: 132 MMOL/L (ref 135–145)
WBC # BLD: 7 K/CU MM (ref 4–10.5)

## 2023-01-06 PROCEDURE — 97166 OT EVAL MOD COMPLEX 45 MIN: CPT

## 2023-01-06 PROCEDURE — 72100 X-RAY EXAM L-S SPINE 2/3 VWS: CPT

## 2023-01-06 PROCEDURE — 99232 SBSQ HOSP IP/OBS MODERATE 35: CPT | Performed by: PHYSICIAN ASSISTANT

## 2023-01-06 PROCEDURE — 1200000000 HC SEMI PRIVATE

## 2023-01-06 PROCEDURE — 94761 N-INVAS EAR/PLS OXIMETRY MLT: CPT

## 2023-01-06 PROCEDURE — 97162 PT EVAL MOD COMPLEX 30 MIN: CPT

## 2023-01-06 PROCEDURE — 97116 GAIT TRAINING THERAPY: CPT

## 2023-01-06 PROCEDURE — 6370000000 HC RX 637 (ALT 250 FOR IP): Performed by: STUDENT IN AN ORGANIZED HEALTH CARE EDUCATION/TRAINING PROGRAM

## 2023-01-06 PROCEDURE — 80048 BASIC METABOLIC PNL TOTAL CA: CPT

## 2023-01-06 PROCEDURE — 2580000003 HC RX 258: Performed by: STUDENT IN AN ORGANIZED HEALTH CARE EDUCATION/TRAINING PROGRAM

## 2023-01-06 PROCEDURE — 85027 COMPLETE CBC AUTOMATED: CPT

## 2023-01-06 PROCEDURE — 97530 THERAPEUTIC ACTIVITIES: CPT

## 2023-01-06 RX ADMIN — PANTOPRAZOLE SODIUM 20 MG: 20 TABLET, DELAYED RELEASE ORAL at 05:43

## 2023-01-06 RX ADMIN — TAMSULOSIN HYDROCHLORIDE 0.4 MG: 0.4 CAPSULE ORAL at 08:29

## 2023-01-06 RX ADMIN — APIXABAN 5 MG: 5 TABLET, FILM COATED ORAL at 10:12

## 2023-01-06 RX ADMIN — HYDROCODONE BITARTRATE AND ACETAMINOPHEN 1 TABLET: 7.5; 325 TABLET ORAL at 05:43

## 2023-01-06 RX ADMIN — CARVEDILOL 12.5 MG: 6.25 TABLET, FILM COATED ORAL at 08:29

## 2023-01-06 RX ADMIN — CARVEDILOL 12.5 MG: 6.25 TABLET, FILM COATED ORAL at 17:05

## 2023-01-06 RX ADMIN — SODIUM CHLORIDE, PRESERVATIVE FREE 10 ML: 5 INJECTION INTRAVENOUS at 20:24

## 2023-01-06 RX ADMIN — ACETAMINOPHEN 500 MG: 500 TABLET ORAL at 17:05

## 2023-01-06 RX ADMIN — SODIUM CHLORIDE, PRESERVATIVE FREE 10 ML: 5 INJECTION INTRAVENOUS at 08:30

## 2023-01-06 RX ADMIN — POLYETHYLENE GLYCOL (3350) 17 G: 17 POWDER, FOR SOLUTION ORAL at 08:30

## 2023-01-06 RX ADMIN — ACETAMINOPHEN 500 MG: 500 TABLET ORAL at 06:04

## 2023-01-06 RX ADMIN — ATORVASTATIN CALCIUM 80 MG: 40 TABLET, FILM COATED ORAL at 08:29

## 2023-01-06 RX ADMIN — LEVOTHYROXINE SODIUM 112 MCG: 0.11 TABLET ORAL at 05:44

## 2023-01-06 RX ADMIN — ACETAMINOPHEN 500 MG: 500 TABLET ORAL at 11:55

## 2023-01-06 RX ADMIN — CITALOPRAM HYDROBROMIDE 20 MG: 20 TABLET ORAL at 08:29

## 2023-01-06 RX ADMIN — APIXABAN 5 MG: 5 TABLET, FILM COATED ORAL at 20:23

## 2023-01-06 RX ADMIN — HYDROCODONE BITARTRATE AND ACETAMINOPHEN 1 TABLET: 7.5; 325 TABLET ORAL at 20:23

## 2023-01-06 ASSESSMENT — PAIN SCALES - GENERAL
PAINLEVEL_OUTOF10: 9
PAINLEVEL_OUTOF10: 5
PAINLEVEL_OUTOF10: 5
PAINLEVEL_OUTOF10: 9

## 2023-01-06 ASSESSMENT — PAIN DESCRIPTION - LOCATION
LOCATION: HIP

## 2023-01-06 ASSESSMENT — PAIN DESCRIPTION - DESCRIPTORS
DESCRIPTORS: SHARP;DULL
DESCRIPTORS: SHARP;DULL

## 2023-01-06 ASSESSMENT — PAIN DESCRIPTION - ORIENTATION
ORIENTATION: LEFT

## 2023-01-06 ASSESSMENT — PAIN - FUNCTIONAL ASSESSMENT: PAIN_FUNCTIONAL_ASSESSMENT: ACTIVITIES ARE NOT PREVENTED

## 2023-01-06 ASSESSMENT — PAIN DESCRIPTION - PAIN TYPE: TYPE: ACUTE PAIN

## 2023-01-06 NOTE — CARE COORDINATION
LSW read PT/OT evals and they are recommending ARU. LSW spoke iwht pt and wife and they are agreeable to ARU. LSW informed pt and his wife that pt's Lysbeth Guard does not usually agree to ARU and this LSW will ask Valeri to look over his info and she if she thinks it will be approved. LSW called and spoke with Cheryle Hernandez. She will look over pt info and get back with this LSW. Valeri called this LSW and pt does not meet the medical criteria for [facility] to approve ARU. LSW will talk with pt about a plan B.      LSW spoke with pt regarding a Plan B and gave him a SNF list. Pt requested [facility]. LSW called kimberly with [facility] and gave referral  LSW will have the weekend CM follow up with Kimberly over the weekend.

## 2023-01-06 NOTE — PROCEDURES
Nurse stated that patient was in too much pain to come down for spine x-rays and that we will try again tomorrow on 1/6. Clarissa Lewis

## 2023-01-06 NOTE — PROGRESS NOTES
V2.0  [facility] [place] Progress Note      Name:  Emigdio Guan /Age/Sex: 1945  (76 y.o. male)   MRN & CSN:  [id number] & [id number] Encounter Date/Time: 2023 7:25 AM EST    Location:  6477/3876-V PCP: Cintia Riley MD       Hospital Day: 3    Assessment and Plan:   Emigdio Guan is a 76 y.o. male  who presents with Closed compression fracture of L5 lumbar vertebra, initial encounter ([facility] [address]      # Acute severe L5 and S2 fractures  - Presented after a mechanical fall at home x4 days prior leading to left hip and buttock pain. Had difficulty ambulating as well, but no B/B incontinence or loss of strength. - CT L-spine showing acute-to-subacute severe compression deformity of L5 with 75% height loss and 6 mm retropulsion, and acute-to-subacute nondisplaced S2 fracture.   -Neurosurgery consulted  Patient was evaluated by neurosurgery and they recommended LSO brace at this time. PT OT recommended ARU. Orthopedic consulted and they recommend conservative management at this time    # CAD s/p PCI in 2012  -Resume and Lipitor     # Essential HTN  - Continue home Coreg. # Hx of protein C deficiency with bilateral BEs   -Resume Eliquis     # GERD  - Continue Protonix. # Acquired hypothyroidism  - Continue Synthroid. Diet ADULT DIET; Regular; Low Fat/Low Chol/High Fiber/HIPOLITO   DVT Prophylaxis [] Lovenox, []  Heparin, [] SCDs, [] Ambulation,  [] Eliquis, [] Xarelto  [] Coumadin   Code Status Full Code   Disposition From: Home  Expected Disposition: ARU  Estimated Date of Discharge: TBD  Patient requires continued admission due to placement to ARU   Surrogate Decision Maker/ POA      Subjective:     Chief Complaint: Fall (No LOC/Takes ASA and eliquis )     Patient seen and examined at bedside this morning. Reports pain is well controlled. Denies chest pain, shortness of breath, nausea vomiting, fever or chills. Objective:      Intake/Output Summary (Last 24 hours) at 2023 0736  Last data filed at 1/6/2023 0237  Gross per 24 hour   Intake --   Output 450 ml   Net -450 ml        Vitals:   Vitals:    01/05/23 2306   BP:    Pulse:    Resp: 16   Temp:    SpO2:        Physical Exam:     General: NAD  Eyes: EOMI  ENT: neck supple  Cardiovascular: Regular rate. Respiratory: Clear to auscultation  Gastrointestinal: Soft, non tender  Genitourinary: no suprapubic tenderness  Musculoskeletal: No edema  Skin: warm, dry  Neuro: Alert. Psych: Mood appropriate. Medications:   Medications:    acetaminophen  500 mg Oral Q6H    atorvastatin  80 mg Oral Daily    carvedilol  12.5 mg Oral BID WC    citalopram  20 mg Oral Daily    levothyroxine  112 mcg Oral Daily    pantoprazole  20 mg Oral Daily    tamsulosin  0.4 mg Oral Daily    sodium chloride flush  5-40 mL IntraVENous 2 times per day    polyethylene glycol  17 g Oral Daily      Infusions:    sodium chloride       PRN Meds: sodium chloride flush, 5-40 mL, PRN  sodium chloride, , PRN  ondansetron, 4 mg, Q8H PRN   Or  ondansetron, 4 mg, Q6H PRN  HYDROcodone-acetaminophen, 1 tablet, Q6H PRN        Labs    No results found for this or any previous visit (from the past 24 hour(s)). Imaging/Diagnostics Last 24 Hours   CT LUMBAR SPINE WO CONTRAST    Result Date: 1/4/2023  EXAMINATION: CT OF THE LUMBAR SPINE WITHOUT CONTRAST  1/4/2023 TECHNIQUE: CT of the lumbar spine was performed without the administration of intravenous contrast. Multiplanar reformatted images are provided for review. Adjustment of mA and/or kV according to patient size was utilized. Automated exposure control, iterative reconstruction, and/or weight based adjustment of the mA/kV was utilized to reduce the radiation dose to as low as reasonably achievable.  COMPARISON: Lumbar spine radiograph January 4, 2020 HISTORY: ORDERING SYSTEM PROVIDED HISTORY: Fall, Pain TECHNOLOGIST PROVIDED HISTORY: Reason for exam:->Fall, Pain Decision Support Exception - unselect if not a suspected or confirmed emergency medical condition->Emergency Medical Condition (MA) Reason for Exam: Fall, Pain Additional signs and symptoms: NO Relevant Medical/Surgical History: NONE FINDINGS: BONES/ALIGNMENT: Acute versus subacute severe compression deformity of the L5 vertebral body with approximately 75% height loss and 6 mm retropulsion resulting in moderate central canal stenosis. Chronic compression deformities of the T12, L1, and L2 vertebral bodies. Levoconvex curvature of the lumbar spine. Osseous mineralization is decreased. Acute to subacute appearing nondisplaced fracture the sacrum centered at the S2 level. DEGENERATIVE CHANGES: Multilevel mild-to-moderate facet arthropathy and mild-to-moderate degenerative disc disease throughout the lumbar spine. More severe facet arthropathy of the lower lumbar spine involving L4-5 and L5-S1. SOFT TISSUES/RETROPERITONEUM: Soft tissues demonstrates severe atherosclerotic disease. IVC filter in place. Partially imaged hiatal hernia. 1. Acute to subacute appearing severe compression deformity of the L5 vertebral body with approximately 75% height loss and 6 mm retropulsion. 2. Acute to subacute appearing nondisplaced fracture of the sacrum centered at the S2 level. 3. Remote/chronic appearing compression deformities of the T12, L1, and L2 vertebral bodies. 4. Osteopenia. XR HIP 2-3 VW W PELVIS LEFT    Result Date: 1/4/2023  EXAMINATION: ONE XRAY VIEW OF THE PELVIS AND TWO XRAY VIEWS LEFT HIP 1/4/2023 6:39 pm COMPARISON: None. HISTORY: ORDERING SYSTEM PROVIDED HISTORY: Pain after fall TECHNOLOGIST PROVIDED HISTORY: Reason for exam:->Pain after fall Reason for Exam: pain after fall FINDINGS: There is no evidence of acute fracture. There is normal alignment. No acute joint abnormality. No focal osseous lesion. No focal soft tissue abnormality. No acute osseous abnormality.        Electronically signed by Opal Garcia MD on 1/6/2023 at 7:25 AM

## 2023-01-06 NOTE — TELEPHONE ENCOUNTER
----- Message from Radha Cunningham PA-C sent at 1/6/2023  1:00 PM EST -----  Follow-up in 6 weeks with lumbar AP/LAT xrays for L5 fracture

## 2023-01-06 NOTE — PROGRESS NOTES
Neurosurgery Progress Note  1/6/2023 7:34 AM      Assessment and Plan:   L5 acute to subacute fracture- 6mm of retropulsion present on CT. He understands that he needs to work with therapy today. Does not report any changes in sensation or strength of his lower extremities. He will need his lumbar xrays completed before discharge and follow-up in 6 weeks in my office with repeat lumbar xrays. Acute to subacute S2 fracture- orthopedics is following. Can weight bear and have range of motion exercises as tolerated. Osteopenia- patient is on calcium and has been following with his PCP for this  CAD s/p PCI- on eliquis and asa  Hx of PE- had IVC filter placed and on eliquis and asa    Supervising physician: Desmond Dumont. Lisandra Bean MD.  Dr. Lisandra Bean was readily and continuously available by phone for direct consultation regarding the care of this patient. Subjective:   Admit Date: 1/4/2023  PCP: Kendra Aviles MD    Patient sitting up in bed. Complains of sacral pain and left sided low back pain with movement. He describes the pain as spasm like and severe. Laying in bed and log rolling he is comfortable. He denies any N/T in his legs or saddle region, denies any difficulty with urination and has had two bowel movements since being admitted. Data:   Scheduled Meds:   acetaminophen  500 mg Oral Q6H    atorvastatin  80 mg Oral Daily    carvedilol  12.5 mg Oral BID WC    citalopram  20 mg Oral Daily    levothyroxine  112 mcg Oral Daily    pantoprazole  20 mg Oral Daily    tamsulosin  0.4 mg Oral Daily    sodium chloride flush  5-40 mL IntraVENous 2 times per day    polyethylene glycol  17 g Oral Daily         I/O last 3 completed shifts:  In: -   Out: 680 [Urine:680]  No intake/output data recorded.     Intake/Output Summary (Last 24 hours) at 1/6/2023 0734  Last data filed at 1/6/2023 4180  Gross per 24 hour   Intake --   Output 450 ml   Net -450 ml     CULTURE results: Invalid input(s): BLOOD CULTURE,  URINE CULTURE, SURGICAL CULTURE  CBC:   Recent Labs     01/05/23 0431   WBC 7.1   HGB 10.8*        BMP:    Recent Labs     01/05/23 0431      K 4.3      CO2 25   BUN 16   CREATININE 1.0   GLUCOSE 84     Hepatic:   Recent Labs     01/05/23 0431   AST 14*   ALT 10   BILITOT 0.7   ALKPHOS 113       IMAGING: available xray, CT, and MRI results reviewed    Objective:   Vitals: BP (!) 145/78   Pulse 67   Temp 98 °F (36.7 °C) (Oral)   Resp 16   Ht 5' 8\" (1.727 m)   Wt 177 lb 8.6 oz (80.5 kg)   SpO2 95%   BMI 26.99 kg/m²   General appearance: alert, laying in bed, in no distress  HEENT: normocephalic, atraumatic, EOMI  Neurologic: Mental status: alert to self, place, situation, speech clear and coherent, no facial droop, follows commands briskly, sensation intact in all extremities  Extremities: bilateral upper extremities able to resist gravity, bilateral lower extremities 5/5 throughout including bilateral EHL  Incision: none  Drains: none      Electronically signed by Gavin Fisher PA-C on 1/6/2023 at 7:34 AM

## 2023-01-06 NOTE — PROGRESS NOTES
Occupational [address] [place] [address] ACUTE CARE OCCUPATIONAL THERAPY EVALUATION    Diana Faulkner, 1945, 3005/3005-A, 1/6/2023    Discharge Recommendation: Inpatient Rehabilitation      History:  [place]:  The primary encounter diagnosis was Closed compression fracture of L5 lumbar vertebra, initial encounter ([facility] [address] A diagnosis of Closed fracture of sacrum, unspecified portion of sacrum, initial encounter [facility]) was also pertinent to this visit. Subjective:  Patient states: \"You've done something good for me today! \" (Pt stated at end of session)  Pain: Pt reported 7/10 pain in low back and Lt pelvic region  Communication with other providers: PT Irwin Lebron, RN Reshma  Restrictions: General Precautions, Fall Risk, WBAT BL LEs, Spinal Precautions, LSO brace with EOB/OOB activity, Telemetry, Pulse Ox, BP cuff, Bed/chair alarm    Home Setup/Prior level of function:  Social/Functional History  Lives With: Spouse  Type of Home: House  Home Layout: Two level, Bed/Bath upstairs, 1/2 bath on main level  Home Access: Stairs to enter with rails  Entrance Stairs - Number of Steps: 4  Bathroom Shower/Tub: Tub/Shower unit  Bathroom Toilet: Handicap height  Bathroom Equipment: Hand-held shower, Grab bars in shower  Home Equipment: Crutches  Has the patient had two or more falls in the past year or any fall with injury in the past year?: No  ADL Assistance: Independent  Homemaking Assistance: Independent  Homemaking Responsibilities: Yes  Ambulation Assistance: Independent (uses no AD)  Transfer Assistance: Independent  Active : Yes  Occupation: Part time employment  Type of Occupation: [employer]    Examination:  Observation: Sitting EOB upon OT arrival with RN present. Pt pleasant and agreeable to evaluation.   Vision: WFL (Glasses)  Hearing: WFL  Vitals: Stable vitals throughout session on room air    Body Systems and functions:  ROM: WFL all joints in BL UEs  Strength: Lt UE 4+/5 MMT proximally, Lt UE 5/5 MMT grasp. Rt UE shoulder flexors 3+/5 MMT (chronic issue per pt), Rt UE 4+/5 MMT elbow flexors and extensors, Rt UE 5/5 MMT grasp  Sensation: WFL in BL UEs (See PT evaluation for LE assessment)  Tone: Normal  Coordination: WNL   Perception: WNL    Activities of Daily Living (ADLs):  Feeding: Independent   Grooming: CGA (able to complete in standing at sink)  UB bathing: SBA   LB bathing: Min A (for reaching bottom due to impaired dynamic standing balance)  UB dressing: SBA  LB dressing: Min A (assist required for clothing mgmt to hips due to impaired dynamic standing balance, able to don BL socks while seated SBA by crossing legs into \"figure 4 position\" and maintain spinal precautions)  Toileting: Max A (assist for clothing mgmt both directions at this time, able to complete seated barbara care without assist)    Cognitive and Psychosocial Functioning:  Overall cognitive status: WFL (anxious behaviors at times and very talkative, requiring frequent re-direction)  Affect: Normal     Balance:   Sitting: SBA in unsupported sitting EOB  Standing: CGA static standing with RW, Min A to Mod A for dynamic standing tasks    Functional Mobility:  Bed Mobility: Not observed. Pt received sitting EOB upon OT arrival.   Transfers: Mod A stand pivot transfer bed to chair, Max A sit to stand from recliner on 1st attempt, Mod A sit to stand from recliner on 2nd attempt (mod cues for technique/safe hand placement with each transfer)  Ambulation: CGA with RW ~20 ft + 10 ft; decreased speed and step-length, significantly increased WB through BL UEs due to back and Lt LE pain      AM-PAC 6 click short form for inpatient daily activity:   How much help from another person does the patient currently need. .. Unable  Dep A Lot  Max A A Lot   Mod A A Little  Min A A Little   CGA  SBA None   Mod I  Indep  Sup   1. Putting on and taking off regular lower body clothing? [] 1    [] 2   [] 2   [x] 3   [] 3   [] 4      2.  Bathing (including washing, rinsing, drying)? [] 1   [] 2   [] 2 [x] 3 [] 3 [] 4   3. Toileting, which includes using toilet, bedpan, or urinal? [] 1    [x] 2   [] 2   [] 3   [] 3   [] 4     4. Putting on and taking off regular upper body clothing? [] 1   [] 2   [] 2   [] 3   [x] 3    [] 4      5. Taking care of personal grooming such as brushing teeth? [] 1   [] 2    [] 2 [] 3    [x] 3   [] 4      6. Eating meals? [] 1   [] 2   [] 2   [] 3   [] 3   [x] 4      Raw Score:  18   [24=0% impaired(CH), 23=1-19%(CI), 20-22=20-39%(CJ), 15-19=40-59%(CK), 10-14=60-79%(CL), 7-9=80-99%(CM), 6=100%(CN)]     Treatment:  Therapeutic Activity Training:   Therapeutic activity training was instructed today. Cues were given for safety, sequence, UE/LE placement, awareness, and balance. Activities performed today included UB/LB dressing tasks, transfer training, functional mobility with RW, education on role of OT, POC, WBAT status BL LEs, spinal precautions, wear of LSO brace, importance of OOB activity, d/c planning     Safety Measures: Gait belt used, Left in Chair, Alarm in place    Assessment:  Pt is a 76year old male with a past medical history of Anemia, Arthritis, CAD S/P percutaneous coronary angioplasty, GERD (gastroesophageal reflux disease), Ludivina filter in place, H/O cardiac catheterization, H/O echocardiogram, H/O exercise stress test, History of complete ECG, Hx of blood clots, Hx of echocardiogram, Hypercoagulable state ([address] Hyperlipidemia, Hypertension, Pulmonary embolism ([address] RBBB (right bundle branch block with left anterior fascicular block), Renal insufficiency, Thyroid disease, and Venous insufficiency. Pt admitted following a fall with back and Lt hip pain. Pt diagnosed with an S2 fracture and L5 burst fracture. Pt cleared by ortho to be WBAT BL LEs and cleared by neurosurgery for EOB/OOB activity with LSO brace and spinal precautions.  Pt lives at home with his wife and at baseline is fully independent with ADLs, IADLs, mobility, driving, and works part time as a rosario. Pt currently presents with the above impairments, and is not safe for immediate return home. Recommend continued OT services in inpatient rehab setting at discharge. Complexity: Moderate  Prognosis: Good  Plan: 4+x/week    Goals:  1. Pt will complete all aspects of bed mobility for EOB/OOB ADLs min A with HOB flat using log roll technique  2. Pt will complete UB/LB bathing CGA with setup   3. Pt will complete all aspects of LB dressing CGA with setup  4. Pt will complete all functional transfers to and from bed, chair, toilet, shower chair min A/good safety awareness  5. Pt will ambulate HH distance to bathroom for toileting SBA with RW  6. Pt will complete all aspects of toileting task CGA  7. Pt will complete oral hygiene/grooming routine in standing at sink SBA with setup/no seated rest breaks  8. Pt will verbalize and be compliant with 3/3 spinal precautions 100% of the time  9.  Pt will don LSO brace seated EOB SBA with setup    Time:   Time in: 1019  Time out: 1048  Timed treatment minutes: 14  Total time: 29    Electronically signed by:    Atanacio Sandhoff, OTR/L, [address], [place].[id number]

## 2023-01-06 NOTE — CONSULTS
Consult to Orthopedic Surgery  Consult performed by: Jeff Yeh DO  Consult ordered by: Tim Rosario MD  Reason for consult: Sacrum fracture  Assessment/Recommendations:     S2 sacral fracture    I discussed with him today his CT findings.  I explained to him that he does have a nondisplaced fracture in his sacrum which will heal with conservative treatment.  I discussed with the patient today that their are symptoms are normal and should improve with time.  I explained to him that these typically take anywhere from 6 weeks to a couple months for the pain to resolve.  Continue weight-bearing as tolerated.  Continue range of motion exercises as instructed.  Ice and elevate as needed.  Tylenol or Motrin for pain.  Follow up in office as needed.      Dariel is a 77-year-old male Who said he originally sustained a fall landing onto his buttocks about a week and a half ago.  He states that he did have some dull aching pain primarily along his lower back and buttocks which gradually worsened over the last week.  He states that the pain got to the point that he is having difficulty walking so he came to the ED.  Multiple CT scans were obtained and he was diagnosed with a sacral fracture as well as a lumbar fracture.  He was subsequently admitted to the hospitalist for medical management, therapy and placement and I was consulted for evaluation of his sacral fracture.  Neurosurgery was consulted for evaluation of his lumbar fracture.    He is currently complaining of dull, aching pain primarily along his low back and buttocks.  He denies any groin pain and denies any radicular pain in either of his legs.  Patient denies any new injury to the involved extremity/ joint, denies numbness or tingling in the involved extremity and denies fever or chills.        Review of Systems   Constitutional:  Negative for activity change, chills and fever.   HENT:  Negative for congestion and drooling.    Eyes:  Negative for  redness. Respiratory:  Negative for chest tightness. Cardiovascular:  Negative for chest pain. Gastrointestinal:  Negative for abdominal pain. Endocrine: Negative for cold intolerance and heat intolerance. Musculoskeletal:  Positive for arthralgias, back pain and myalgias. Negative for gait problem and joint swelling. Skin:  Negative for color change, pallor, rash and wound. Neurological:  Negative for weakness and numbness. Psychiatric/Behavioral:  Negative for confusion. Past Medical History:   Diagnosis Date    Anemia     Arthritis     CAD S/P percutaneous coronary angioplasty 8/13/2012    [place] RCA, Stent in Diagonal    GERD (gastroesophageal reflux disease)     Ludivina filter in place     H/O cardiac catheterization 8/9/12    H/O echocardiogram     3/13 Mild MR/TR, 7/12 mildly reduced LVS with inferolateral wall hypokinesis, mildly reduced LV EF 35%, mild lt ventricular dilatation, mod lt atrial enlargement, mild mitral regurg, mild tricuspid regurg    H/O exercise stress test 8/28/12    normal study. EF 48%, exercise 4.6 METS    History of complete ECG 8/22/12    Hx of blood clots     lt  arm    Hx of echocardiogram 07/01/2015    EF 45-50% Trace MR and TR, mild pulm. htn. Hypercoagulable state ([address]     Hyperlipidemia     Hypertension     Pulmonary embolism ([address] 1980    RBBB (right bundle branch block with left anterior fascicular block)     Renal insufficiency 9/19/2018    Creatinine 1.3, 8/2018    Thyroid disease     Venous insufficiency      No current facility-administered medications on file prior to encounter. Current Outpatient Medications on File Prior to Encounter   Medication Sig Dispense Refill    apixaban (ELIQUIS STARTER PACK) 5 MG TABS tablet Take 10 mg (2 tablets) orally twice daily for 7 days, then take 5 mg (1 tablet) orally twice daily thereafter.  74 tablet 0    psyllium (KONSYL) 28.3 % PACK Take 1 packet by mouth daily (Patient not taking: Reported on 9/29/2022)      acetaminophen (TYLENOL) 500 MG tablet Take 500 mg by mouth every 6 hours as needed for Pain      citalopram (CELEXA) 40 MG tablet       imipramine (TOFRANIL) 25 MG tablet 50 mg nightly       tamsulosin (FLOMAX) 0.4 MG capsule       alendronate (FOSAMAX) 70 MG tablet Take 70 mg by mouth every 7 days      calcium carbonate (TUMS EX) 750 MG chewable tablet Take 1 tablet by mouth 2 times daily       atorvastatin (LIPITOR) 80 MG tablet Take 80 mg by mouth daily       carvedilol (COREG) 12.5 MG tablet Take 12.5 mg by mouth 2 times daily (with meals). pantoprazole (PROTONIX) 40 MG tablet   Take 20 mg by mouth daily       aspirin 81 MG EC tablet Take 81 mg by mouth daily. levothyroxine (SYNTHROID) 112 MCG tablet Take 112 mcg by mouth daily. nitroGLYCERIN (NITROSTAT) 0.4 MG SL tablet Place 0.4 mg under the tongue every 5 minutes as needed. Allergies   Allergen Reactions    Bee Venom Swelling    Plavix [Clopidogrel]      Skin rash      Poison Ivy Treatment [Benzyl Alcohol-Camphor-Menthol]      Past Surgical History:   Procedure Laterality Date    CORONARY ANGIOPLASTY WITH STENT PLACEMENT  8/13/12    diag stented    KNEE SURGERY      LEG TENDON SURGERY      TONSILLECTOMY      TUMOR REMOVAL       Social History     Tobacco Use    Smoking status: Former    Smokeless tobacco: Never    Tobacco comments:     reviewed 03/09/16   Vaping Use    Vaping Use: Never used   Substance Use Topics    Alcohol use: Not Currently     Comment: caffeine 2 cups of coffee a day    Drug use: No     Family History   Problem Relation Age of Onset    Heart Disease Mother     Stroke Mother     Heart Disease Brother     Cancer Father     Heart Disease Maternal Uncle        Right Knee Exam   Right knee exam is normal.    Muscle Strength   The patient has normal right knee strength. Tenderness   The patient is experiencing no tenderness. Range of Motion   The patient has normal right knee ROM.     Other Erythema: absent  Sensation: normal  Pulse: present  Swelling: none  Effusion: no effusion present      Left Knee Exam   Left knee exam is normal.    Muscle Strength   The patient has normal left knee strength. Tenderness   The patient is experiencing no tenderness. Range of Motion   The patient has normal left knee ROM. Other   Erythema: absent  Sensation: normal  Pulse: present  Swelling: none  Effusion: no effusion present      Right Hip Exam   Right hip exam is normal.     Tenderness   The patient is experiencing no tenderness. Range of Motion   The patient has normal right hip ROM. Muscle Strength   The patient has normal right hip strength. Other   Erythema: absent  Sensation: normal  Pulse: present      Left Hip Exam   Left hip exam is normal.    Tenderness   The patient is experiencing no tenderness. Muscle Strength   The patient has normal left hip strength. Other   Erythema: absent  Sensation: normal  Pulse: present        Mild tenderness over the bilateral SI joints. Bilateral lower extremities-Skin intact with no erythema, ecchymosis or lacerations present. No groin pain with range of motion of the hips. Intact sensation motor function to all nerve distributions of the extremity. +2 DP  Compartment soft. BP (!) 149/80   Pulse 68   Temp 97.1 °F (36.2 °C) (Oral)   Resp 18   Ht 5' 8\" (1.727 m)   Wt 175 lb 14.8 oz (79.8 kg)   SpO2 95%   BMI 26.75 kg/m²     CT LUMBAR SPINE WO CONTRAST    Result Date: 1/4/2023  EXAMINATION: CT OF THE LUMBAR SPINE WITHOUT CONTRAST  1/4/2023 TECHNIQUE: CT of the lumbar spine was performed without the administration of intravenous contrast. Multiplanar reformatted images are provided for review. Adjustment of mA and/or kV according to patient size was utilized.   Automated exposure control, iterative reconstruction, and/or weight based adjustment of the mA/kV was utilized to reduce the radiation dose to as low as reasonably achievable. COMPARISON: Lumbar spine radiograph January 4, 2020 HISTORY: ORDERING SYSTEM PROVIDED HISTORY: Fall, Pain TECHNOLOGIST PROVIDED HISTORY: Reason for exam:->Fall, Pain Decision Support Exception - unselect if not a suspected or confirmed emergency medical condition->Emergency Medical Condition (MA) Reason for Exam: Fall, Pain Additional signs and symptoms: NO Relevant Medical/Surgical History: NONE FINDINGS: BONES/ALIGNMENT: Acute versus subacute severe compression deformity of the L5 vertebral body with approximately 75% height loss and 6 mm retropulsion resulting in moderate central canal stenosis. Chronic compression deformities of the T12, L1, and L2 vertebral bodies. Levoconvex curvature of the lumbar spine. Osseous mineralization is decreased. Acute to subacute appearing nondisplaced fracture the sacrum centered at the S2 level. DEGENERATIVE CHANGES: Multilevel mild-to-moderate facet arthropathy and mild-to-moderate degenerative disc disease throughout the lumbar spine. More severe facet arthropathy of the lower lumbar spine involving L4-5 and L5-S1. SOFT TISSUES/RETROPERITONEUM: Soft tissues demonstrates severe atherosclerotic disease. IVC filter in place. Partially imaged hiatal hernia. 1. Acute to subacute appearing severe compression deformity of the L5 vertebral body with approximately 75% height loss and 6 mm retropulsion. 2. Acute to subacute appearing nondisplaced fracture of the sacrum centered at the S2 level. 3. Remote/chronic appearing compression deformities of the T12, L1, and L2 vertebral bodies. 4. Osteopenia. XR HIP 2-3 VW W PELVIS LEFT    Result Date: 1/4/2023  EXAMINATION: ONE XRAY VIEW OF THE PELVIS AND TWO XRAY VIEWS LEFT HIP 1/4/2023 6:39 pm COMPARISON: None. HISTORY: ORDERING SYSTEM PROVIDED HISTORY: Pain after fall TECHNOLOGIST PROVIDED HISTORY: Reason for exam:->Pain after fall Reason for Exam: pain after fall FINDINGS: There is no evidence of acute fracture. There is normal alignment. No acute joint abnormality. No focal osseous lesion. No focal soft tissue abnormality. No acute osseous abnormality.     Who states that he is

## 2023-01-06 NOTE — CONSULTS
Kirstin LUCIO Darryn Shaw, 1945, 3005/3005-A, 1/6/2023    History  [place]:  The primary encounter diagnosis was Closed compression fracture of L5 lumbar vertebra, initial encounter ([facility] [place] [address] A diagnosis of Closed fracture of sacrum, unspecified portion of sacrum, initial encounter [facility]) was also pertinent to this visit. Patient  has a past medical history of Anemia, Arthritis, CAD S/P percutaneous coronary angioplasty, GERD (gastroesophageal reflux disease), Ludivina filter in place, H/O cardiac catheterization, H/O echocardiogram, H/O exercise stress test, History of complete ECG, Hx of blood clots, Hx of echocardiogram, Hypercoagulable state ([facility] [address] Hyperlipidemia, Hypertension, Pulmonary embolism ([facility] [address] RBBB (right bundle branch block with left anterior fascicular block), Renal insufficiency, Thyroid disease, and Venous insufficiency. Patient  has a past surgical history that includes Leg Tendon Surgery; tumor removal; Tonsillectomy; knee surgery; and Coronary angioplasty with stent (8/13/12). Subjective:  Patient states:  \"It's encouraging to be up. \"    Pain:  7/10 pain in low back and L LE.     Communication with other providers:  Handoff to RN, OT  Restrictions: WBAT L LE, LSO when upright and OOB, fall risk, general precautions, spinal precautions    Home Setup/Prior level of function  Social/Functional History  Lives With: Spouse  Type of Home: House  Home Layout: Two level, Bed/Bath upstairs, 1/2 bath on main level  Home Access: Stairs to enter with rails  Entrance Stairs - Number of Steps: 4  Bathroom Shower/Tub: Tub/Shower unit  Bathroom Toilet: Handicap height  Bathroom Equipment: Hand-held shower, Grab bars in [address]: Crutches (does not use AD at baseline)  Has the patient had two or more falls in the past year or any fall with injury in the past year?: No  ADL Assistance: [address] [place] [address] Avenue: Independent  Homemaking Responsibilities: Yes  Ambulation Assistance: Independent  Transfer Assistance: Independent  Active : Yes  Occupation: Part time employment  Type of Occupation: rosario    Examination of body systems (includes body structures/functions, activity/participation limitations):  Observation:  Pt sitting EOB with RN and tech upon arrival and agreeable to therapy  Vision:  Wears glasses  Hearing:  WFL  Cardiopulmonary:  no O2 needs  Cognition: WFL, see OT/SLP note for further evaluation. Musculoskeletal  ROM R/L:  WFL. Strength R/L:  4/5, moderate impairment in function and endurance. Neuro:  WFL, denies n/t      Mobility:  Rolling L/R:  NT, pt sitting at beginning and end of session  Supine to sit:  NT, pt sitting at beginning and end of session  Transfers: Pt completed stand pivot transfer without an AD mod A. Pt completed STS to/from chair x1 trial max A and x1 trial mod A. Cues provided for hand placement and sequencing throughout. Sitting balance:  good. Standing balance:  fair+. Gait: pt ambulated 20' + 10' with RW CGA with decreased alfredo, decreased stance phase on L LE, decreases step length bilaterally, and overreliance of UEs on RW. Cues provided for L leading step to gait pattern and walker management throughout. Close chair follow provided for safety and pt with seated rest breaks in between bouts    [facility] 6 Clicks Inpatient Mobility:  AM-PAC Inpatient Mobility Raw Score : 13    Safety: patient left in chair with alarm on, call light within reach, RN notified, gait belt used. Assessment:  Pt is a 76 y.o. male admitted to the hospital after a fall resulting in T12, L1, and L2 fractures and is to wear an LSO when upright and OOB. Pt also found to have a nondisplaced fracture in his sacrum and is to be WBAT. Pt is typically independent with all ambulation and transfers without a device.  Pt is currently performing transfers max A and ambulating 10' with RW CGA and chair follow. Pt is presenting with decreased endurance, impaired balance, increased pain, impaired transfers, impaired gait. Pt would benefit from continued acute care PT as well as ARU placement upon discharge. If pt not accepted to ARU, recommend swing bed. Complexity: moderate    Prognosis: Good, no significant barriers to participation at this time.      General Plan:  (4+)/week     Equipment: TBD at next level of care    Goals:  Short Term Goals  Time Frame for Short Term Goals: 1 week  Short Term Goal 1: Pt to complete all bed mobility SBA  Short Term Goal 2: Pt to complete STS to/from bed, commode, and chair min A  Short Term Goal 3: Pt to ambulate 48' with LRAD CGA       Treatment plan:  Bed mobility, transfers, balance, gait, TA, TX    Recommendations for NURSING mobility: amb with gait belt, RW, and LSO    Time:   Time in: 1019  Time out: 1048  Timed treatment minutes: 15  Total time: 29    Electronically signed by:    Ananya Tang, PT  1/6/2023, 12:20 PM

## 2023-01-06 NOTE — CARE COORDINATION
Received referral for ARU. Reviewed patients clinicals and PT/OT notes. Patients primary insurance is [employer]. Per [facility] guidelines patient does not meet ARU criteria d/t lack of medical complexity. Discussed with Vel Sparks.

## 2023-01-06 NOTE — LETTER
[facility] Neurosurgery  [address]  Phone: [phone]  Fax: [phone]    Franco Estes        January 9, 2023    [address] [facility], [place] [address]    Dear Amarjit Mckeon: This is just a reminder that Anjali Chandler has a 6-week follow up appointment scheduled with [facility] Sandra ROPER on Tuesday, 2/21/23 @ 130 pm to follow up on the fracture in his back. Patti Brantley would like Evangelista Arevalo to have an Xray done prior to this appointment. Please have this done either 2/16/23 or 2/17/23 so that the results can be reviewed with you at the appointment. Evangelista Arevalo can get this done on a walk-in basis at the [address] [place] [address] on [facility]. Their address is [address] [place] [address] in [facility]. Hours are Monday - Friday 7 am - 7 pm and Saturdays 8 am - 12 pm.    If you have any questions or concerns, or if you need to reschedule this appointment, please don't hesitate to call our office at [phone]. We do ask that you contact our office at least 24 hours prior to your appointment time if you do need to reschedule. Otherwise, we will see Evangelista Arevalo on the day of his appointment.         Sincerely,        Valerio Harvey PA-C

## 2023-01-07 ENCOUNTER — APPOINTMENT (OUTPATIENT)
Dept: GENERAL RADIOLOGY | Age: 78
DRG: 543 | End: 2023-01-07
Payer: COMMERCIAL

## 2023-01-07 VITALS
HEART RATE: 77 BPM | RESPIRATION RATE: 18 BRPM | DIASTOLIC BLOOD PRESSURE: 81 MMHG | OXYGEN SATURATION: 98 % | SYSTOLIC BLOOD PRESSURE: 145 MMHG | WEIGHT: 180.34 LBS | BODY MASS INDEX: 27.33 KG/M2 | TEMPERATURE: 97.1 F | HEIGHT: 68 IN

## 2023-01-07 LAB
ANION GAP SERPL CALCULATED.3IONS-SCNC: 13 MMOL/L (ref 4–16)
BUN BLDV-MCNC: 14 MG/DL (ref 6–23)
CALCIUM SERPL-MCNC: 8.6 MG/DL (ref 8.3–10.6)
CHLORIDE BLD-SCNC: 101 MMOL/L (ref 99–110)
CO2: 23 MMOL/L (ref 21–32)
CREAT SERPL-MCNC: 0.9 MG/DL (ref 0.9–1.3)
GFR SERPL CREATININE-BSD FRML MDRD: >60 ML/MIN/1.73M2
GLUCOSE BLD-MCNC: 119 MG/DL (ref 70–99)
HCT VFR BLD CALC: 36.9 % (ref 42–52)
HEMOGLOBIN: 11.8 GM/DL (ref 13.5–18)
MCH RBC QN AUTO: 25.9 PG (ref 27–31)
MCHC RBC AUTO-ENTMCNC: 32 % (ref 32–36)
MCV RBC AUTO: 80.9 FL (ref 78–100)
PDW BLD-RTO: 15.9 % (ref 11.7–14.9)
PLATELET # BLD: 276 K/CU MM (ref 140–440)
PMV BLD AUTO: 9.4 FL (ref 7.5–11.1)
POTASSIUM SERPL-SCNC: 4.3 MMOL/L (ref 3.5–5.1)
RBC # BLD: 4.56 M/CU MM (ref 4.6–6.2)
SARS-COV-2, NAAT: NOT DETECTED
SODIUM BLD-SCNC: 137 MMOL/L (ref 135–145)
SOURCE: NORMAL
WBC # BLD: 7.2 K/CU MM (ref 4–10.5)

## 2023-01-07 PROCEDURE — 94761 N-INVAS EAR/PLS OXIMETRY MLT: CPT

## 2023-01-07 PROCEDURE — 2580000003 HC RX 258: Performed by: STUDENT IN AN ORGANIZED HEALTH CARE EDUCATION/TRAINING PROGRAM

## 2023-01-07 PROCEDURE — 6370000000 HC RX 637 (ALT 250 FOR IP): Performed by: STUDENT IN AN ORGANIZED HEALTH CARE EDUCATION/TRAINING PROGRAM

## 2023-01-07 PROCEDURE — 85027 COMPLETE CBC AUTOMATED: CPT

## 2023-01-07 PROCEDURE — 36415 COLL VENOUS BLD VENIPUNCTURE: CPT

## 2023-01-07 PROCEDURE — 87635 SARS-COV-2 COVID-19 AMP PRB: CPT

## 2023-01-07 PROCEDURE — 80048 BASIC METABOLIC PNL TOTAL CA: CPT

## 2023-01-07 RX ORDER — HYDROCODONE BITARTRATE AND ACETAMINOPHEN 7.5; 325 MG/1; MG/1
1 TABLET ORAL EVERY 6 HOURS PRN
Qty: 6 TABLET | Refills: 0 | Status: SHIPPED | OUTPATIENT
Start: 2023-01-07 | End: 2023-01-10

## 2023-01-07 RX ADMIN — ACETAMINOPHEN 500 MG: 500 TABLET ORAL at 06:12

## 2023-01-07 RX ADMIN — PANTOPRAZOLE SODIUM 20 MG: 20 TABLET, DELAYED RELEASE ORAL at 06:12

## 2023-01-07 RX ADMIN — ATORVASTATIN CALCIUM 80 MG: 40 TABLET, FILM COATED ORAL at 08:04

## 2023-01-07 RX ADMIN — CITALOPRAM HYDROBROMIDE 20 MG: 20 TABLET ORAL at 08:04

## 2023-01-07 RX ADMIN — CARVEDILOL 12.5 MG: 6.25 TABLET, FILM COATED ORAL at 08:04

## 2023-01-07 RX ADMIN — TAMSULOSIN HYDROCHLORIDE 0.4 MG: 0.4 CAPSULE ORAL at 08:04

## 2023-01-07 RX ADMIN — ACETAMINOPHEN 500 MG: 500 TABLET ORAL at 13:27

## 2023-01-07 RX ADMIN — LEVOTHYROXINE SODIUM 112 MCG: 0.11 TABLET ORAL at 06:12

## 2023-01-07 RX ADMIN — HYDROCODONE BITARTRATE AND ACETAMINOPHEN 1 TABLET: 7.5; 325 TABLET ORAL at 08:09

## 2023-01-07 RX ADMIN — APIXABAN 5 MG: 5 TABLET, FILM COATED ORAL at 08:04

## 2023-01-07 RX ADMIN — ACETAMINOPHEN 500 MG: 500 TABLET ORAL at 01:12

## 2023-01-07 RX ADMIN — SODIUM CHLORIDE, PRESERVATIVE FREE 10 ML: 5 INJECTION INTRAVENOUS at 08:04

## 2023-01-07 RX ADMIN — POLYETHYLENE GLYCOL (3350) 17 G: 17 POWDER, FOR SOLUTION ORAL at 08:04

## 2023-01-07 ASSESSMENT — PAIN DESCRIPTION - LOCATION
LOCATION: GENERALIZED
LOCATION: BACK
LOCATION: GENERALIZED
LOCATION: BACK

## 2023-01-07 ASSESSMENT — PAIN SCALES - WONG BAKER
WONGBAKER_NUMERICALRESPONSE: 0

## 2023-01-07 ASSESSMENT — PAIN DESCRIPTION - ORIENTATION: ORIENTATION: MID

## 2023-01-07 ASSESSMENT — PAIN SCALES - GENERAL
PAINLEVEL_OUTOF10: 5
PAINLEVEL_OUTOF10: 5
PAINLEVEL_OUTOF10: 7
PAINLEVEL_OUTOF10: 0
PAINLEVEL_OUTOF10: 3

## 2023-01-07 ASSESSMENT — PAIN DESCRIPTION - DESCRIPTORS
DESCRIPTORS: DISCOMFORT
DESCRIPTORS: ACHING
DESCRIPTORS: ACHING
DESCRIPTORS: ACHING;DISCOMFORT

## 2023-01-07 ASSESSMENT — PAIN - FUNCTIONAL ASSESSMENT
PAIN_FUNCTIONAL_ASSESSMENT: ACTIVITIES ARE NOT PREVENTED
PAIN_FUNCTIONAL_ASSESSMENT: ACTIVITIES ARE NOT PREVENTED
PAIN_FUNCTIONAL_ASSESSMENT: PREVENTS OR INTERFERES SOME ACTIVE ACTIVITIES AND ADLS
PAIN_FUNCTIONAL_ASSESSMENT: PREVENTS OR INTERFERES SOME ACTIVE ACTIVITIES AND ADLS

## 2023-01-07 NOTE — DISCHARGE INSTR - COC
Continuity of Care Form    Patient Name: Jatin Uribe   :  1945  MRN:  [id number]    Admit date:  2023  Discharge date:  ***    Code Status Order: Full Code   Advance Directives:     Admitting Physician:  Kina Hernandez MD  PCP: Enma Aquino MD    Discharging Nurse: [facility] Unit/Room#: 5383/1106-E  Discharging Unit Phone Number: ***    Emergency Contact:   Extended Emergency Contact Information  Primary Emergency Contact: [facility]  Address: [address] [place] [address], [address] Phone: [phone]  Mobile Phone: [phone]  Relation: Spouse    Past Surgical History:  Past Surgical History:   Procedure Laterality Date    CORONARY ANGIOPLASTY WITH STENT PLACEMENT  12    diag stented    KNEE SURGERY      LEG TENDON SURGERY      TONSILLECTOMY      TUMOR REMOVAL         Immunization History:   Immunization History   Administered Date(s) Administered    COVID-19, MODERNA Bivalent BOOSTER, (age 12y+), IM, 48 mcg/0.5 mL 2022    COVID-19, PFIZER PURPLE top, DILUTE for use, (age 15 y+), 30mcg/0.3mL 2021, 2021, 2021    Influenza, FLUZONE (age 72 y+), High Dose, 0.7mL 2020    Pneumococcal Polysaccharide (Rdjfztpjb19) 2012, 2013    Tdap (Boostrix, Adacel) 2015       Active Problems:  Patient Active Problem List   Diagnosis Code    HTN (hypertension) I10    Hyperlipidemia E78.5    Cardiomyopathy ([address] I42.9    Hypercoagulable state ([address] D68.59    CAD S/P stent in Diag 2012 I25.10, Z98.61    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 XXXA, Y92.009    Compression fracture of L1 lumbar vertebra (HCC) S32.010A    Acute bilateral low back pain M54.50 Closed compression fracture of L5 lumbar vertebra, initial encounter ([facility] S32.050A       Isolation/Infection:   Isolation            No Isolation          Patient Infection Status       None to display            Nurse Assessment:  Last Vital Signs: /70   Pulse 76   Temp 97.9 °F (36.6 °C) (Oral)   Resp 18   Ht 5' 8\" (1.727 m)   Wt 180 lb 5.4 oz (81.8 kg)   SpO2 98%   BMI 27.42 kg/m²     Last documented pain score (0-10 scale): Pain Level: 5  Last Weight:   Wt Readings from Last 1 Encounters:   01/07/23 180 lb 5.4 oz (81.8 kg)     Mental Status:  oriented and alert    IV Access:  - None    Nursing Mobility/ADLs:  Walking   Dependent  Transfer  Dependent  Bathing  Assisted  Dressing  Assisted  Toileting  Assisted  Feeding  Independent  Med Admin  Independent  Med Delivery   none    Wound Care Documentation and Therapy:        Elimination:  Continence: Bowel: Yes  Bladder: Yes  Urinary Catheter: None   Colostomy/Ileostomy/Ileal Conduit: No       Date of Last BM: ***    Intake/Output Summary (Last 24 hours) at 1/7/2023 1333  Last data filed at 1/7/2023 0929  Gross per 24 hour   Intake --   Output 1275 ml   Net -1275 ml     I/O last 3 completed shifts: In: 120 [P.O.:120]  Out: 1475 [Urine:1475]    Safety Concerns:     History of Falls (last 30 days) and At Risk for Falls    Impairments/Disabilities:      None    Nutrition Therapy:  Current Nutrition Therapy:   - Oral Diet:  General    Routes of Feeding: Oral  Liquids: No Restrictions  Daily Fluid Restriction: no  Last Modified Barium Swallow with Video (Video Swallowing Test): not done    Treatments at the Time of Hospital Discharge:   Respiratory Treatments: ***  Oxygen Therapy:  is not on home oxygen therapy.   Ventilator:    - No ventilator support    Rehab Therapies: Physical Therapy and Occupational Therapy  Weight Bearing Status/Restrictions: No weight bearing restrictions  Other Medical Equipment (for information only, NOT a DME order):  back brace  Other Treatments: ***    Patient's personal belongings (please select all that are sent with patient):  {[facility] DME Belongings:[id number]}    RN SIGNATURE:  Electronically signed by Chante Carlson RN on 1/7/23 at 3:11 PM EST    CASE MANAGEMENT/SOCIAL WORK SECTION    Inpatient Status Date: ***    Readmission Risk Assessment Score:  Readmission Risk              Risk of Unplanned Readmission:  11           Discharging to Facility/ Agency   Name:   Address:  Phone:  Fax:    Dialysis Facility (if applicable)   Name:  Address:  Dialysis Schedule:  Phone:  Fax:    / signature: {Esignature:[id number]}    PHYSICIAN SECTION    Prognosis: Fair    Condition at Discharge: Stable    Rehab Potential (if transferring to Rehab): Fair    Recommended Labs or Other Treatments After Discharge: cbc/bmp    Physician Certification: I certify the above information and transfer of Mariah Fernandez  is necessary for the continuing treatment of the diagnosis listed and that he requires [place] for greater 30 days.      Update Admission H&P: No change in H&P    PHYSICIAN SIGNATURE:  Electronically signed by Hai Hall MD on 1/7/23 at 1:34 PM EST

## 2023-01-07 NOTE — DISCHARGE SUMMARY
V2.0  Discharge Summary    Name:  Angela Shea /Age/Sex: 1945 (1 y.o. 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 Fall happened after he fell forward. Had difficulty ambulating as well, but no B/B incontinence or loss of strength. No head trauma, LOC or bleeding. No other complaints. Brief Problem Based Course:      # Acute severe L5 and S2 fractures  - Presented after a mechanical fall at home x4 days prior leading to left hip and buttock pain. Had difficulty ambulating as well, but no B/B incontinence or loss of strength. - CT L-spine showing acute-to-subacute severe compression deformity of L5 with 75% height loss and 6 mm retropulsion, and acute-to-subacute nondisplaced S2 fracture.   -Neurosurgery consulted  Patient was evaluated by neurosurgery and they recommended LSO brace at this time. PT OT recommended ARU. Patient denied for ARU. Patient approved for SNF. Continue follow-up with orthopedic surgery      # CAD s/p PCI in 2012  -Resume ASA/ Lipitor     # Essential HTN  - Continue home Coreg. # Hx of protein C deficiency with bilateral BEs   -Resume Eliquis     # GERD  - Continue Protonix. # Acquired hypothyroidism  - Continue Synthroid. The patient expressed appropriate understanding of, and agreement with the discharge recommendations, medications, and plan.      Consults this admission:  IP CONSULT TO NEUROSURGERY  IP CONSULT TO ORTHOPEDIC SURGERY    Discharge Diagnosis:   Closed compression fracture of L5 lumbar vertebra, initial encounter [facility])        Discharge Instruction:   Follow up appointments:   Primary care physician: Luther Nicholas MD within 2 weeks  Diet: regular diet   Activity: activity as tolerated  Disposition: Discharged to:   []Home, []HHC, [x]SNF, []Acute Rehab, []Hospice   Condition on discharge: Stable  Labs and Tests to be Followed up as an outpatient by PCP or Specialist:     Discharge Medications:        Medication List        START taking these medications      HYDROcodone-acetaminophen 7.5-325 MG per tablet  Commonly known as: [address]  Take 1 tablet by mouth every 6 hours as needed for Pain for up to 3 days. Max Daily Amount: 4 tablets            CONTINUE taking these medications      acetaminophen 500 MG tablet  Commonly known as: TYLENOL     alendronate 70 MG tablet  Commonly known as: FOSAMAX     apixaban 5 MG Tabs tablet  Commonly known as: Eliquis DVT/PE Starter Pack  Take 10 mg (2 tablets) orally twice daily for 7 days, then take 5 mg (1 tablet) orally twice daily thereafter.      aspirin 81 MG EC tablet     atorvastatin 80 MG tablet  Commonly known as: LIPITOR     calcium carbonate 750 MG chewable tablet  Commonly known as: TUMS EX     carvedilol 12.5 MG tablet  Commonly known as: COREG     citalopram 40 MG tablet  Commonly known as: CELEXA     levothyroxine 112 MCG tablet  Commonly known as: SYNTHROID     nitroGLYCERIN 0.4 MG SL tablet  Commonly known as: NITROSTAT     pantoprazole 40 MG tablet  Commonly known as: PROTONIX     tamsulosin 0.4 MG capsule  Commonly known as: FLOMAX            STOP taking these medications      imipramine 25 MG tablet  Commonly known as: TOFRANIL     psyllium 28.3 % Pack  Commonly known as: Remona Dears               Where to Get Your Medications        You can get these medications from any pharmacy    Bring a paper prescription for each of these medications  HYDROcodone-acetaminophen 7.5-325 MG per tablet        Objective Findings at Discharge:   BP (!) 145/81   Pulse 77   Temp 97.1 °F (36.2 °C) (Oral)   Resp 18   Ht 5' 8\" (1.727 m)   Wt 180 lb 5.4 oz (81.8 kg)   SpO2 98%   BMI 27.42 kg/m²       Physical Exam:   General: NAD  Eyes: EOMI  ENT: neck supple  Cardiovascular: Regular rate. Respiratory: Clear to auscultation  Gastrointestinal: Soft, non tender  Genitourinary: no suprapubic tenderness  Musculoskeletal: No edema  Skin: warm, dry  Neuro: Alert. Psych: Mood appropriate. Labs and Imaging   XR LUMBAR SPINE (2-3 VIEWS)    Result Date: 1/6/2023  EXAMINATION: 2 XRAY VIEWS OF THE LUMBAR SPINE 1/6/2023 3:27 pm COMPARISON: Lumbar spine radiographs 01/04/2020. CT lumbar spine 01/04/2023. HISTORY: ORDERING SYSTEM PROVIDED HISTORY: standign with LSO brace, L5 fracture TECHNOLOGIST PROVIDED HISTORY: Reason for exam:->standign with LSO brace, L5 fracture Reason for Exam: standign with LSO brace, L5 fracture FINDINGS: Multiple chronic compression fractures of the T12, L1, and L2 vertebral bodies. Severe L5 compression fracture without significant change. Accentuation of the thoracolumbar junction kyphosis. Mild lumbar spine levoscoliosis. Normal alignment is otherwise maintained. Multilevel degenerative disc disease throughout the lumbar spine. The bilateral sacroiliac joints are intact. And inferior vena cava filter is in place. Unchanged severe L5 compression fracture. CT LUMBAR SPINE WO CONTRAST    Result Date: 1/4/2023  EXAMINATION: CT OF THE LUMBAR SPINE WITHOUT CONTRAST  1/4/2023 TECHNIQUE: CT of the lumbar spine was performed without the administration of intravenous contrast. Multiplanar reformatted images are provided for review. Adjustment of mA and/or kV according to patient size was utilized. Automated exposure control, iterative reconstruction, and/or weight based adjustment of the mA/kV was utilized to reduce the radiation dose to as low as reasonably achievable.  COMPARISON: Lumbar spine radiograph January 4, 2020 HISTORY: ORDERING SYSTEM PROVIDED HISTORY: Fall, Pain TECHNOLOGIST PROVIDED HISTORY: Reason for exam:->Fall, Pain Decision Support Exception - unselect if not a suspected or confirmed emergency medical condition->Emergency Medical Condition (MA) Reason for Exam: Fall, Pain Additional signs and symptoms: NO Relevant Medical/Surgical History: NONE FINDINGS: BONES/ALIGNMENT: Acute versus subacute severe compression deformity of the L5 vertebral body with approximately 75% height loss and 6 mm retropulsion resulting in moderate central canal stenosis. Chronic compression deformities of the T12, L1, and L2 vertebral bodies. Levoconvex curvature of the lumbar spine. Osseous mineralization is decreased. Acute to subacute appearing nondisplaced fracture the sacrum centered at the S2 level. DEGENERATIVE CHANGES: Multilevel mild-to-moderate facet arthropathy and mild-to-moderate degenerative disc disease throughout the lumbar spine. More severe facet arthropathy of the lower lumbar spine involving L4-5 and L5-S1. SOFT TISSUES/RETROPERITONEUM: Soft tissues demonstrates severe atherosclerotic disease. IVC filter in place. Partially imaged hiatal hernia. 1. Acute to subacute appearing severe compression deformity of the L5 vertebral body with approximately 75% height loss and 6 mm retropulsion. 2. Acute to subacute appearing nondisplaced fracture of the sacrum centered at the S2 level. 3. Remote/chronic appearing compression deformities of the T12, L1, and L2 vertebral bodies. 4. Osteopenia. XR HIP 2-3 VW W PELVIS LEFT    Result Date: 1/4/2023  EXAMINATION: ONE XRAY VIEW OF THE PELVIS AND TWO XRAY VIEWS LEFT HIP 1/4/2023 6:39 pm COMPARISON: None. HISTORY: ORDERING SYSTEM PROVIDED HISTORY: Pain after fall TECHNOLOGIST PROVIDED HISTORY: Reason for exam:->Pain after fall Reason for Exam: pain after fall FINDINGS: There is no evidence of acute fracture. There is normal alignment. No acute joint abnormality.   No focal osseous lesion. No focal soft tissue abnormality. No acute osseous abnormality. CBC:   Recent Labs     01/05/23  0431 01/06/23  1323 01/07/23  1001   WBC 7.1 7.0 7.2   HGB 10.8* 11.2* 11.8*    284 276     BMP:    Recent Labs     01/05/23  0431 01/06/23  1323 01/07/23  1001    132* 137   K 4.3 4.7 4.3    98* 101   CO2 25 25 23   BUN 16 17 14   CREATININE 1.0 0.9 0.9   GLUCOSE 84 108* 119*     Hepatic:   Recent Labs     01/05/23  0431   AST 14*   ALT 10   BILITOT 0.7   ALKPHOS 113     Lipids:   Lab Results   Component Value Date/Time    CHOL 112 09/15/2022 12:00 AM    HDL 52 09/15/2022 12:00 AM    TRIG 49 09/15/2022 12:00 AM     Hemoglobin A1C:   Lab Results   Component Value Date/Time    LABA1C 6.1 07/01/2015 09:53 AM     TSH:   Lab Results   Component Value Date/Time    TSH 3.320 09/09/2020 12:00 AM     Troponin:   Lab Results   Component Value Date/Time    TROPONINT <0.010 01/04/2020 02:20 PM    TROPONINT <0.010 07/01/2015 11:00 PM    TROPONINT <0.010 07/01/2015 03:51 PM     Lactic Acid: No results for input(s): LACTA in the last 72 hours. BNP: No results for input(s): PROBNP in the last 72 hours.   UA:No results found for: Lorraine Bouquet, PHUR, LABCAST, WBCUA, RBCUA, MUCUS, TRICHOMONAS, YEAST, BACTERIA, CLARITYU, SPECGRAV, LEUKOCYTESUR, UROBILINOGEN, BILIRUBINUR, BLOODU, GLUCOSEU, KETUA, AMORPHOUS  Urine Cultures: No results found for: LABURIN  Blood Cultures: No results found for: BC  No results found for: BLOODCULT2  Organism: No results found for: ORG    Time Spent Discharging patient 35 minutes    Electronically signed by Hussain Luevano MD on 1/7/2023 at 2:19 PM

## 2023-01-07 NOTE — CARE COORDINATION
CM reviewed chart and spoke w/ Kimberly at [facility]. Pt has been approved.   Dr. Dank Ruth notified via ps.    1400 Kimberly at [facility] and pt's nurse Julieth notified of 1700

## 2023-01-07 NOTE — PROGRESS NOTES
Attempted to call report to [facility] with no answer.   time Alva Vega RN   4:41 PM  1/7/2023

## 2023-01-09 NOTE — TELEPHONE ENCOUNTER
No return call from [facility] ty. Spine care instructions and appointment reminder letter for Tuesday, 2/21/23 @ 130 pm sent to facility via mail.

## 2023-01-10 ENCOUNTER — HOSPITAL ENCOUNTER (OUTPATIENT)
Age: 78
Setting detail: SPECIMEN
Discharge: HOME | End: 2023-01-10

## 2023-01-10 LAB
ALBUMIN SERPL-MCNC: 4 GM/DL (ref 3.4–5)
ALP BLD-CCNC: 181 IU/L (ref 40–128)
ALT SERPL-CCNC: 10 U/L (ref 10–40)
ANION GAP SERPL CALCULATED.3IONS-SCNC: 14 MMOL/L (ref 4–16)
AST SERPL-CCNC: 16 IU/L (ref 15–37)
BASOPHILS ABSOLUTE: 0.1 K/CU MM
BASOPHILS RELATIVE PERCENT: 1.2 % (ref 0–1)
BILIRUB SERPL-MCNC: 0.7 MG/DL (ref 0–1)
BUN BLDV-MCNC: 20 MG/DL (ref 6–23)
CALCIUM SERPL-MCNC: 9.3 MG/DL (ref 8.3–10.6)
CHLORIDE BLD-SCNC: 99 MMOL/L (ref 99–110)
CO2: 25 MMOL/L (ref 21–32)
CREAT SERPL-MCNC: 1.2 MG/DL (ref 0.9–1.3)
DIFFERENTIAL TYPE: ABNORMAL
EOSINOPHILS ABSOLUTE: 0.4 K/CU MM
EOSINOPHILS RELATIVE PERCENT: 4.9 % (ref 0–3)
GFR SERPL CREATININE-BSD FRML MDRD: >60 ML/MIN/1.73M2
GLUCOSE BLD-MCNC: 127 MG/DL (ref 70–99)
HCT VFR BLD CALC: 38.7 % (ref 42–52)
HEMOGLOBIN: 12 GM/DL (ref 13.5–18)
IMMATURE NEUTROPHIL %: 0.4 % (ref 0–0.43)
LYMPHOCYTES ABSOLUTE: 2.1 K/CU MM
LYMPHOCYTES RELATIVE PERCENT: 28.7 % (ref 24–44)
MCH RBC QN AUTO: 25.9 PG (ref 27–31)
MCHC RBC AUTO-ENTMCNC: 31 % (ref 32–36)
MCV RBC AUTO: 83.4 FL (ref 78–100)
MONOCYTES ABSOLUTE: 0.6 K/CU MM
MONOCYTES RELATIVE PERCENT: 7.6 % (ref 0–4)
NUCLEATED RBC %: 0 %
PDW BLD-RTO: 16.5 % (ref 11.7–14.9)
PLATELET # BLD: 342 K/CU MM (ref 140–440)
PMV BLD AUTO: 10 FL (ref 7.5–11.1)
POTASSIUM SERPL-SCNC: 4.3 MMOL/L (ref 3.5–5.1)
RBC # BLD: 4.64 M/CU MM (ref 4.6–6.2)
SEGMENTED NEUTROPHILS ABSOLUTE COUNT: 4.1 K/CU MM
SEGMENTED NEUTROPHILS RELATIVE PERCENT: 57.2 % (ref 36–66)
SODIUM BLD-SCNC: 138 MMOL/L (ref 135–145)
TOTAL IMMATURE NEUTOROPHIL: 0.03 K/CU MM
TOTAL NUCLEATED RBC: 0 K/CU MM
TOTAL PROTEIN: 6.3 GM/DL (ref 6.4–8.2)
WBC # BLD: 7.2 K/CU MM (ref 4–10.5)

## 2023-01-10 PROCEDURE — 80053 COMPREHEN METABOLIC PANEL: CPT

## 2023-01-10 PROCEDURE — 85025 COMPLETE CBC W/AUTO DIFF WBC: CPT

## 2023-01-10 PROCEDURE — 36415 COLL VENOUS BLD VENIPUNCTURE: CPT

## 2023-02-16 ENCOUNTER — HOSPITAL ENCOUNTER (OUTPATIENT)
Dept: GENERAL RADIOLOGY | Age: 78
Discharge: HOME | End: 2023-02-16
Payer: COMMERCIAL

## 2023-02-16 ENCOUNTER — TELEPHONE (OUTPATIENT)
Dept: NEUROSURGERY | Age: 78
End: 2023-02-16

## 2023-02-16 ENCOUNTER — HOSPITAL ENCOUNTER (OUTPATIENT)
Age: 78
Discharge: HOME | End: 2023-02-16
Payer: COMMERCIAL

## 2023-02-16 DIAGNOSIS — S32.050A CLOSED COMPRESSION FRACTURE OF L5 LUMBAR VERTEBRA, INITIAL ENCOUNTER (HCC): ICD-10-CM

## 2023-02-16 PROCEDURE — 72100 X-RAY EXAM L-S SPINE 2/3 VWS: CPT

## 2023-02-20 RX ORDER — PANTOPRAZOLE SODIUM 20 MG/1
1 TABLET, DELAYED RELEASE ORAL DAILY
COMMUNITY
Start: 2023-01-16

## 2023-02-20 NOTE — TELEPHONE ENCOUNTER
Spoke to patient regarding upcoming appointment with Carol Francois PA-C on Tuesday, 2/21/23 @ 130 pm. Patient was discharged from [facility] and now resides at home. This appointment has been confirmed.

## 2023-02-21 ENCOUNTER — OFFICE VISIT (OUTPATIENT)
Dept: NEUROSURGERY | Age: 78
End: 2023-02-21
Payer: COMMERCIAL

## 2023-02-21 VITALS
HEART RATE: 74 BPM | OXYGEN SATURATION: 96 % | DIASTOLIC BLOOD PRESSURE: 82 MMHG | BODY MASS INDEX: 28.23 KG/M2 | SYSTOLIC BLOOD PRESSURE: 138 MMHG | RESPIRATION RATE: 16 BRPM | HEIGHT: 68 IN | WEIGHT: 186.25 LBS

## 2023-02-21 DIAGNOSIS — S32.051D CLOSED STABLE BURST FRACTURE OF FIFTH LUMBAR VERTEBRA WITH ROUTINE HEALING, SUBSEQUENT ENCOUNTER: Primary | ICD-10-CM

## 2023-02-21 PROCEDURE — 1123F ACP DISCUSS/DSCN MKR DOCD: CPT | Performed by: PHYSICIAN ASSISTANT

## 2023-02-21 PROCEDURE — 99213 OFFICE O/P EST LOW 20 MIN: CPT | Performed by: PHYSICIAN ASSISTANT

## 2023-02-21 PROCEDURE — 3079F DIAST BP 80-89 MM HG: CPT | Performed by: PHYSICIAN ASSISTANT

## 2023-02-21 PROCEDURE — 3075F SYST BP GE 130 - 139MM HG: CPT | Performed by: PHYSICIAN ASSISTANT

## 2023-02-21 NOTE — PROGRESS NOTES
Patient presents to the office today for a 6 week  follow up on L5 compression fracture. Pain scale 0/10 today. Denies any numbness, tingling or weakness in any extremities. Patient states he has been wearing his LSO brace as ordered and has been compliant with restrictions. Patient has been ambulating without any assistive device    He has no complaints at this time. No new injuries or falls since last being seen. Overall patient is doing well. XR Lumbar Spine 2/16/23  Impression   Stable compression fractures at T12, L1, L2, and L5. Stable moderate to severe degenerative disc disease within the lumbar spine. Mild levoconvex scoliosis.

## 2023-02-21 NOTE — PATIENT INSTRUCTIONS
Avoid excessive bending/twisting of lumbar spine. Wear brace whenever upright and out of bed. You can remove the brace when you are laying in bed or on the couch. Limit weight restrictions to less than 15 pounds for 3 months. Follow-up in office in 6 weeks with repeat xrays. Notify the neurosurgical office urgently if you begin to develop any numbness or tingling in extremities, weakness in extremities, or loss of bowel or bladder control.       Obtain lumbar xrays prior to appointment

## 2023-02-21 NOTE — PROGRESS NOTES
Neurosurgery Office Note    Chief Complaint: 6-week follow-up of L5 fracture    HPI:  [de-identified] y.o. 1945  Who presents today for 6-week follow-up of L5 fracture. Patient was seen at [facility] on 1/5/2023 with complaints of progressively worsening low back pain left buttock pain after a fall 4 to 5 days prior to presentation. Patient developed worsening back pain and left hip pain, he had significant difficulty ambulating because of this. CT in the ER showed an L5 burst fracture with 6 mm of retropulsion. He has chronic T12 and L1 and L2 fractures. Patient did have significant pain in his back, denied any radiating pain into his lower extremities, denied any saddle paresthesias. He was given an LSO brace and told to follow-up in 6 weeks. Today he states that he is doing well overall. He rates his back pain a 1-2 out of 10. He continues to deny any radiation of pain into his lower extremities, denies any numbness/tingling or weakness. He does ambulate with a cane which is at his baseline. He was discharged from the hospital to the nursing facility, has been home for roughly a month. States that he does not have home health come out to see him. He states that he has been wearing his back brace as prescribed. He has been walking without overextending himself. He states that he walked around [employer] recently. He denies any saddle paresthesias or bowel/bladder incontinence. He was also diagnosed with an S2 fracture that was nondisplaced, orthopedics did see him in the hospital.  He states that he has no pain in his buttocks. Patient is on Eliquis and ASA 81 mg. PMHx positive for arthritis, CAD, GERD, hyperlipidemia, hypertension, PE, renal insufficiency. Past Surgical history positive for IVC filter placement, coronary angioplasty with stent placement, knee surgery.                   Past Medical and Surgical History:       Diagnosis Date    Anemia     Arthritis     CAD S/P percutaneous coronary angioplasty 8/13/2012    [place] RCA, Stent in Diagonal    GERD (gastroesophageal reflux disease)     Luidvina filter in place     H/O cardiac catheterization 8/9/12    H/O echocardiogram     3/13 Mild MR/TR, 7/12 mildly reduced LVS with inferolateral wall hypokinesis, mildly reduced LV EF 35%, mild lt ventricular dilatation, mod lt atrial enlargement, mild mitral regurg, mild tricuspid regurg    H/O exercise stress test 8/28/12    normal study. EF 48%, exercise 4.6 METS    History of complete ECG 8/22/12    Hx of blood clots     lt  arm    Hx of echocardiogram 07/01/2015    EF 45-50% Trace MR and TR, mild pulm. htn. Hypercoagulable state ([address]     Hyperlipidemia     Hypertension     Pulmonary embolism ([address] 1980    RBBB (right bundle branch block with left anterior fascicular block)     Renal insufficiency 9/19/2018    Creatinine 1.3, 8/2018    Thyroid disease     Venous insufficiency          Procedure Laterality Date    CORONARY ANGIOPLASTY WITH STENT PLACEMENT  8/13/12    diag stented    KNEE SURGERY      LEG TENDON SURGERY      TONSILLECTOMY      TUMOR REMOVAL         Social History:    TOBACCO:   reports that he has quit smoking. He has never used smokeless tobacco.  ETOH:   reports that he does not currently use alcohol. Family History:       Problem Relation Age of Onset    Heart Disease Mother     Stroke Mother     Heart Disease Brother     Cancer Father     Heart Disease Maternal Uncle        Current Medications:    No current facility-administered medications for this visit.     Allergies   Allergen Reactions    Bee Venom Swelling    Plavix [Clopidogrel]      Skin rash      Poison Ivy Treatment [Benzyl Alcohol-Camphor-Menthol]         REVIEW OF SYSTEMS:    CONSTITUTIONAL:  negative for fevers, chills, diaphoresis, activity change, appetite change, fatigue  EYES:  negative for blurred vision, eye discharge, visual disturbance and icterus  HEENT:  negative for hearing loss, tinnitus, ear drainage, sinus pressure, nasal congestion  RESPIRATORY:  No cough, shortness of breath, hemoptysis  GASTROINTESTINAL:  negative for nausea, vomiting, diarrhea, constipation  GENITOURINARY:  negative for frequency, dysuria, urinary incontinence, decreased urine volume, and hematuria  HEMATOLOGIC/LYMPHATIC:  negative for easy bruising, bleeding and lymphadenopathy  MUSCULOSKELETAL:  negative for  pain, joint swelling, decreased range of motion and muscle weakness  NEUROLOGICAL:  negative for headaches, slurred speech, unilateral weakness  PSYCHIATRIC/BEHAVIORAL: negative for hallucinations, behavioral problems, confusion and agitation. Objective:   PHYSICAL EXAM:      VITALS:  /82 (Site: Right Upper Arm, Position: Sitting, Cuff Size: Medium Adult)   Pulse 74   Resp 16   Ht 5' 8\" (1.727 m)   Wt 186 lb 4 oz (84.5 kg)   SpO2 96%   BMI 28.32 kg/m²      24HR INTAKE/OUTPUT:  No intake or output data in the 24 hours ending 02/21/23 1412  CONSTITUTIONAL:  Awake, alert, cooperative, no apparent distress, and appears stated age  HEENT: NCAT, PERRL, EOMI. Sclera white, conjunctive full. NECK:  Supple, symmetrical, trachea midline, no adenopathy  PSYCHIATRIC: Oriented to person place and time. No obvious depression or anxiety. MUSCULOSKELETAL: No obvious misalignment or effusion of the joints. No clubbing, cyanosis of the digits. SKIN:  normal skin color, texture, turgor and no redness, warmth, or swelling.  No palpable nodules or stigmata of embolic phenomenon  NEUROLOGIC: Alert and oriented x4, face symmetrical, no obvious droop, speech clear and coherent, sensation intact in bilateral lower extremities  Upper extremity strength: Bilateral upper extremities able to resist gravity  Lower extremity strength: Bilateral lower extremities 5/5 throughout, bilateral patellar DTR 2+    DATA:    Old records have been reviewed      Radiology Review:  All pertinent images / reports were reviewed as a part of this visit. X-ray lumbar spine 2/16/2023  Impression   Stable compression fractures at T12, L1, L2, and L5. Stable moderate to severe degenerative disc disease within the lumbar spine. Mild levoconvex scoliosis. Assessment and plan:     1. Closed stable burst fracture of fifth lumbar vertebra with routine healing, subsequent encounter   -Patient who presents with 6-week follow-up of an L5 compression fracture. X-rays obtained 2/16/2023 were reviewed with the patient. His L5 fracture is stable compared to when he was in the hospital.  Patient has no red flag symptoms at this time, no radiculopathy, no numbness/tingling in his lower extremities. He is able to control his urine and his bowels. Patient was advised to continue with his lumbar restrictions for an additional 6 weeks and obtain repeat x-rays. He was given strict return precautions. He was encouraged to keep ambulating as much as possible. -     XR LUMBAR SPINE (2-3 VIEWS); Future       Next steps: Continue with lumbar restrictions, follow-up with me in 6 weeks with repeat lumbar x-rays    Electronically signed by Celestino Solis PA-C on 2/21/2023 at 2:12 PM      Supervising physician: Tony Bean. Denise Restrepo MD.  Dr. Denise Restrepo was readily and continuously available by phone for direct consultation regarding the care of this patient. Time spent with patient in consultation, education, and collaboration with medical time is >50% of total time spent on case, including time spent in chart review and dictation. Total time spent: 20 minutes    Thank you for the opportunity to participate in the care of your patient.

## 2023-02-22 ENCOUNTER — CLINICAL DOCUMENTATION (OUTPATIENT)
Dept: NEUROSURGERY | Age: 78
End: 2023-02-22

## 2023-02-22 NOTE — PROGRESS NOTES
Appointment reminder letter for 12 week f/u on L5 compression fracture sent to patient via mail - 4/4/23 @ 150 pm.

## 2023-02-22 NOTE — LETTER
[facility]  [address]  Phone: [phone]  Fax: [phone]    Frida Nielsen        February 22, 2023    [facility] [address]      Dear Bri Bingham: This is just a reminder that you have a 12-week follow up appointment scheduled with Krystyna Adams PA-C on Tuesday, 4/4/23 @ 150 pm to follow up on the fracture in your back. Loraine Aileen would like you to have an Xray done prior to this appointment. Please have this done between 3/31/23 and 4/1/23 (no earlier) so that the results can be reviewed with you at the appointment. You can get this done on a walk-in basis at the [address] [place] [address] on [facility]. Their address is [address] [place] [address] in [facility]. Hours are Monday - Friday 7 am - 7 pm and Saturdays 8 am - 12 pm.     If you have any questions or concerns, or if you need to reschedule this appointment, please don't hesitate to call our office at [phone]. We do ask that you contact our office at least 24 hours prior to your appointment time if you do need to reschedule. Otherwise, we will see you on the day of your appointment.         Sincerely,        Alexa Molina PA-C

## 2023-03-22 ENCOUNTER — TELEPHONE (OUTPATIENT)
Dept: NEUROSURGERY | Age: 78
End: 2023-03-22

## 2023-03-22 DIAGNOSIS — S32.051D CLOSED STABLE BURST FRACTURE OF FIFTH LUMBAR VERTEBRA WITH ROUTINE HEALING, SUBSEQUENT ENCOUNTER: Primary | ICD-10-CM

## 2023-03-22 NOTE — TELEPHONE ENCOUNTER
Spoke to patient regarding canceling upcoming appointment scheduled with Hamlet Goins PA-C on Tuesday, 4/4/23 @ 150 pm due to lack of neurosurgery coverage in [facility] presently. Will refer to Dr Harpal Pink. Patient agreeable and verbalized understanding. Referral has been placed and faxed to Dr Harpal Pink via [address] [place] [address] Rd (routing) with all pertinent information. Nothing further needed at this time.

## 2023-03-27 ENCOUNTER — HOSPITAL ENCOUNTER (OUTPATIENT)
Age: 78
Discharge: HOME | End: 2023-03-27
Payer: COMMERCIAL

## 2023-03-27 LAB
25(OH)D3 SERPL-MCNC: 34.64 NG/ML
ALBUMIN SERPL-MCNC: 4.2 GM/DL (ref 3.4–5)
ALP BLD-CCNC: 76 IU/L (ref 40–129)
ALT SERPL-CCNC: 10 U/L (ref 10–40)
ANION GAP SERPL CALCULATED.3IONS-SCNC: 7 MMOL/L (ref 4–16)
AST SERPL-CCNC: 15 IU/L (ref 15–37)
BILIRUB SERPL-MCNC: 0.6 MG/DL (ref 0–1)
BUN SERPL-MCNC: 17 MG/DL (ref 6–23)
CALCIUM SERPL-MCNC: 9.2 MG/DL (ref 8.3–10.6)
CHLORIDE BLD-SCNC: 105 MMOL/L (ref 99–110)
CO2: 28 MMOL/L (ref 21–32)
CREAT SERPL-MCNC: 1.2 MG/DL (ref 0.9–1.3)
GFR SERPL CREATININE-BSD FRML MDRD: >60 ML/MIN/1.73M2
GLUCOSE SERPL-MCNC: 90 MG/DL (ref 70–99)
MAGNESIUM: 2 MG/DL (ref 1.8–2.4)
PHOSPHORUS: 3.8 MG/DL (ref 2.5–4.9)
POTASSIUM SERPL-SCNC: 4.7 MMOL/L (ref 3.5–5.1)
SODIUM BLD-SCNC: 140 MMOL/L (ref 135–145)
TOTAL PROTEIN: 6.3 GM/DL (ref 6.4–8.2)

## 2023-03-27 PROCEDURE — 83970 ASSAY OF PARATHORMONE: CPT

## 2023-03-27 PROCEDURE — 80053 COMPREHEN METABOLIC PANEL: CPT

## 2023-03-27 PROCEDURE — 36415 COLL VENOUS BLD VENIPUNCTURE: CPT

## 2023-03-27 PROCEDURE — 82306 VITAMIN D 25 HYDROXY: CPT

## 2023-03-27 PROCEDURE — 84100 ASSAY OF PHOSPHORUS: CPT

## 2023-03-27 PROCEDURE — 83883 ASSAY NEPHELOMETRY NOT SPEC: CPT

## 2023-03-27 PROCEDURE — 83735 ASSAY OF MAGNESIUM: CPT

## 2023-03-28 LAB
KAPPA LC FREE SER-MCNC: 28.23 MG/L (ref 3.3–19.4)
KAPPA LC FREE/LAMBDA FREE SER NEPH: 1.5 {RATIO} (ref 0.26–1.65)
LAMBDA LC FREE SERPL-MCNC: 18.77 MG/L (ref 5.71–26.3)
PTH-INTACT SERPL-MCNC: 63 PG/ML (ref 15–65)

## 2023-05-17 ENCOUNTER — APPOINTMENT (OUTPATIENT)
Dept: CT IMAGING | Age: 78
End: 2023-05-17
Payer: COMMERCIAL

## 2023-05-17 ENCOUNTER — HOSPITAL ENCOUNTER (EMERGENCY)
Age: 78
Discharge: HOME | End: 2023-05-17
Attending: EMERGENCY MEDICINE
Payer: COMMERCIAL

## 2023-05-17 VITALS
HEART RATE: 75 BPM | DIASTOLIC BLOOD PRESSURE: 84 MMHG | RESPIRATION RATE: 16 BRPM | BODY MASS INDEX: 25.18 KG/M2 | HEIGHT: 69 IN | WEIGHT: 170 LBS | SYSTOLIC BLOOD PRESSURE: 142 MMHG | TEMPERATURE: 97.8 F | OXYGEN SATURATION: 97 %

## 2023-05-17 DIAGNOSIS — S01.81XA FACIAL LACERATION, INITIAL ENCOUNTER: Primary | ICD-10-CM

## 2023-05-17 PROCEDURE — 99284 EMERGENCY DEPT VISIT MOD MDM: CPT

## 2023-05-17 PROCEDURE — 2500000003 HC RX 250 WO HCPCS: Performed by: EMERGENCY MEDICINE

## 2023-05-17 PROCEDURE — 70486 CT MAXILLOFACIAL W/O DYE: CPT

## 2023-05-17 PROCEDURE — 70450 CT HEAD/BRAIN W/O DYE: CPT

## 2023-05-17 PROCEDURE — 12013 RPR F/E/E/N/L/M 2.6-5.0 CM: CPT

## 2023-05-17 PROCEDURE — 72125 CT NECK SPINE W/O DYE: CPT

## 2023-05-17 RX ORDER — LIDOCAINE HYDROCHLORIDE AND EPINEPHRINE BITARTRATE 20; .01 MG/ML; MG/ML
20 INJECTION, SOLUTION SUBCUTANEOUS ONCE
Status: COMPLETED | OUTPATIENT
Start: 2023-05-17 | End: 2023-05-17

## 2023-05-17 RX ADMIN — LIDOCAINE HYDROCHLORIDE AND EPINEPHRINE 20 ML: 20; 10 INJECTION, SOLUTION INFILTRATION; PERINEURAL at 14:40

## 2023-05-17 ASSESSMENT — PAIN SCALES - GENERAL: PAINLEVEL_OUTOF10: 5

## 2023-05-17 ASSESSMENT — PAIN - FUNCTIONAL ASSESSMENT: PAIN_FUNCTIONAL_ASSESSMENT: NONE - DENIES PAIN

## 2023-05-17 ASSESSMENT — PAIN DESCRIPTION - DESCRIPTORS: DESCRIPTORS: BURNING

## 2023-05-17 ASSESSMENT — PAIN DESCRIPTION - ORIENTATION: ORIENTATION: RIGHT

## 2023-05-17 NOTE — ED PROVIDER NOTES
Triage Chief Complaint:    Head Injury (/)    HPI   Luis Morales is a [de-identified] y.o. male that presents for evaluation of mechanical fall. Patient sustained hematoma laceration to the right. Patient has denied syncopal episode states that he had just tripped and fallen. He denied hitting any other part of his body. No pain elsewhere. He has not noticed any change in vision but does state the swelling has compromised how much she can lift his eyelid up. He has denied numbness tingling or focal weaknesses. 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 History from : Patient    Limitations to history : None    ROS:  10 systems reviewed, see HPI for pertinent positives and negatives, otherwise negative. Past Medical History:   Diagnosis Date    Anemia     Arthritis     CAD S/P percutaneous coronary angioplasty 8/13/2012    Anamalous RCA, Stent in Diagonal    GERD (gastroesophageal reflux disease)     Ludivina filter in place     H/O cardiac catheterization 8/9/12    H/O echocardiogram     3/13 Mild MR/TR, 7/12 mildly reduced LVS with inferolateral wall hypokinesis, mildly reduced LV EF 35%, mild lt ventricular dilatation, mod lt atrial enlargement, mild mitral regurg, mild tricuspid regurg    H/O exercise stress test 8/28/12    normal study. EF 48%, exercise 4.6 METS    History of complete ECG 8/22/12    Hx of blood clots     lt  arm    Hx of echocardiogram 07/01/2015    EF 45-50% Trace MR and TR, mild pulm. htn.      Hypercoagulable state ([address]     Hyperlipidemia     Hypertension     Pulmonary embolism ([address] 1980    RBBB (right bundle branch block with left anterior fascicular block)     Renal insufficiency 9/19/2018    Creatinine 1.3, 8/2018    Thyroid disease     Venous insufficiency      Past Surgical History:   Procedure Laterality Date    CORONARY ANGIOPLASTY WITH STENT PLACEMENT  8/13/12    diag stented    KNEE SURGERY      LEG TENDON SURGERY      TONSILLECTOMY      TUMOR REMOVAL

## 2023-08-31 LAB
ALBUMIN SERPL-MCNC: 4 G/DL
ALP BLD-CCNC: 73 U/L
ALT SERPL-CCNC: 10 U/L
ANION GAP SERPL CALCULATED.3IONS-SCNC: NORMAL MMOL/L
AST SERPL-CCNC: 16 U/L
BASOPHILS ABSOLUTE: ABNORMAL
BASOPHILS RELATIVE PERCENT: ABNORMAL
BILIRUB SERPL-MCNC: 0.4 MG/DL (ref 0.1–1.4)
BUN BLDV-MCNC: 19 MG/DL
CALCIUM SERPL-MCNC: 9 MG/DL
CHLORIDE BLD-SCNC: 105 MMOL/L
CHOLESTEROL, TOTAL: 104 MG/DL
CHOLESTEROL/HDL RATIO: NORMAL
CO2: 27 MMOL/L
CREAT SERPL-MCNC: 1.2 MG/DL
EGFR: 62
EOSINOPHILS ABSOLUTE: ABNORMAL
EOSINOPHILS RELATIVE PERCENT: ABNORMAL
GLUCOSE BLD-MCNC: 85 MG/DL
HCT VFR BLD CALC: 35 % (ref 41–53)
HDLC SERPL-MCNC: 48 MG/DL (ref 35–70)
HEMOGLOBIN: 11.2 G/DL (ref 13.5–17.5)
LDL CHOLESTEROL CALCULATED: 46 MG/DL (ref 0–160)
LYMPHOCYTES ABSOLUTE: ABNORMAL
LYMPHOCYTES RELATIVE PERCENT: ABNORMAL
MCH RBC QN AUTO: 25.7 PG
MCHC RBC AUTO-ENTMCNC: 32 G/DL
MCV RBC AUTO: 80.5 FL
MONOCYTES ABSOLUTE: ABNORMAL
MONOCYTES RELATIVE PERCENT: ABNORMAL
NEUTROPHILS ABSOLUTE: ABNORMAL
NEUTROPHILS RELATIVE PERCENT: ABNORMAL
NONHDLC SERPL-MCNC: NORMAL MG/DL
PDW BLD-RTO: 16.5 %
PLATELET # BLD: 255 K/ΜL
PMV BLD AUTO: 11.1 FL
POTASSIUM SERPL-SCNC: 4.7 MMOL/L
RBC # BLD: 4.35 10^6/ΜL
SODIUM BLD-SCNC: 143 MMOL/L
T4 FREE: 1.41
TOTAL PROTEIN: 6.1
TRIGL SERPL-MCNC: 52 MG/DL
TSH SERPL DL<=0.05 MIU/L-ACNC: 1.06 UIU/ML
VLDLC SERPL CALC-MCNC: 10 MG/DL
WBC # BLD: 6.4 10^3/ML

## 2023-10-25 ENCOUNTER — OFFICE VISIT (OUTPATIENT)
Dept: CARDIOLOGY CLINIC | Age: 78
End: 2023-10-25
Payer: COMMERCIAL

## 2023-10-25 ENCOUNTER — NURSE ONLY (OUTPATIENT)
Dept: CARDIOLOGY CLINIC | Age: 78
End: 2023-10-25

## 2023-10-25 VITALS
WEIGHT: 177 LBS | SYSTOLIC BLOOD PRESSURE: 126 MMHG | BODY MASS INDEX: 26.22 KG/M2 | HEART RATE: 65 BPM | OXYGEN SATURATION: 95 % | DIASTOLIC BLOOD PRESSURE: 68 MMHG | HEIGHT: 69 IN

## 2023-10-25 DIAGNOSIS — R29.6 FREQUENT FALLS: ICD-10-CM

## 2023-10-25 DIAGNOSIS — Z86.718 HX OF BLOOD CLOTS: ICD-10-CM

## 2023-10-25 DIAGNOSIS — I10 PRIMARY HYPERTENSION: Chronic | ICD-10-CM

## 2023-10-25 DIAGNOSIS — I45.3 TRIFASCICULAR BLOCK: Primary | ICD-10-CM

## 2023-10-25 DIAGNOSIS — I45.3 TRIFASCICULAR BLOCK: ICD-10-CM

## 2023-10-25 DIAGNOSIS — I45.2 RBBB (RIGHT BUNDLE BRANCH BLOCK WITH LEFT ANTERIOR FASCICULAR BLOCK): ICD-10-CM

## 2023-10-25 DIAGNOSIS — E78.00 PURE HYPERCHOLESTEROLEMIA: Chronic | ICD-10-CM

## 2023-10-25 DIAGNOSIS — I25.10 CAD S/P PERCUTANEOUS CORONARY ANGIOPLASTY: Primary | ICD-10-CM

## 2023-10-25 DIAGNOSIS — Z98.61 CAD S/P PERCUTANEOUS CORONARY ANGIOPLASTY: Primary | ICD-10-CM

## 2023-10-25 PROCEDURE — 1123F ACP DISCUSS/DSCN MKR DOCD: CPT | Performed by: INTERNAL MEDICINE

## 2023-10-25 PROCEDURE — 3074F SYST BP LT 130 MM HG: CPT | Performed by: INTERNAL MEDICINE

## 2023-10-25 PROCEDURE — 93000 ELECTROCARDIOGRAM COMPLETE: CPT | Performed by: INTERNAL MEDICINE

## 2023-10-25 PROCEDURE — 99214 OFFICE O/P EST MOD 30 MIN: CPT | Performed by: INTERNAL MEDICINE

## 2023-10-25 PROCEDURE — 3078F DIAST BP <80 MM HG: CPT | Performed by: INTERNAL MEDICINE

## 2023-10-25 RX ORDER — PANTOPRAZOLE SODIUM 40 MG/1
40 TABLET, DELAYED RELEASE ORAL DAILY
COMMUNITY
Start: 2023-07-27

## 2023-10-25 RX ORDER — IMIPRAMINE HCL 25 MG
75 TABLET ORAL NIGHTLY
COMMUNITY
Start: 2023-10-02

## 2023-10-25 NOTE — PATIENT INSTRUCTIONS
48 hour Holter. Continue current cardiovascular medications which have been reviewed and discussed individually with you. Patient is educated about being high risk for fall. Maximum fall precautions counseled. Questions answered. Patient verbalized understanding. Primary/secondary prevention is the goal by aggressive risk modification, healthy and therapeutic life style changes for cardiovascular risk reduction. Various goals are discussed and questions answered. Appropriate prescriptions if needed on this visit are addressed. After visit summery is provided. Questions answered and patient verbalizes understanding. Follow up in 12 months with ECG,  sooner if needed.

## 2023-10-25 NOTE — ASSESSMENT & PLAN NOTE
Patient is educated about being high risk for fall. Maximum fall precautions counseled. Questions answered. Patient verbalized understanding.

## 2023-10-25 NOTE — PROGRESS NOTES
months with ECG,  sooner if needed. Luis Rivera MD, 10/25/2023 8:43 AM     Please note this report has been partially produced using speech recognition software and may contain errors related to that system including errors in grammar, punctuation, and spelling, as well as words and phrases that may be inappropriate. If there are any questions or concerns please feel free to contact the dictating provider for clarification.

## 2023-10-25 NOTE — PROGRESS NOTES
48 hour holter monitor applied [email or web address] for Trifascicular block Serial # [id number] . Instructed patient on monitor and proper use. Instructed on diary. When to remove and bring it back. Must leave the holter monitor on  without removing for the duration of time ordered. Answered all questions the patient had. Instructed patient to call [facility] at [phone] with any questions or concerns with the monitor.

## 2023-11-10 ENCOUNTER — TELEPHONE (OUTPATIENT)
Dept: CARDIOLOGY CLINIC | Age: 78
End: 2023-11-10

## 2023-11-10 NOTE — TELEPHONE ENCOUNTER
Left message advising patient of holter monitor results. ----- Message from Manjinder Engle MD sent at 11/3/2023  4:02 PM EDT -----  48-hour Holter monitor done to evaluate trifascicular block. Patient is in normal sinus average rate is 66 bpm.  Minimum rate of 50 bpm occurred at 11:37 PM and the maximum rate was 101 bpm occurred at 9:29 PM.  Frequent 7360 PVCs are noted forming 4% of total QRS complexes and infrequent 436 PACs are also noted. Patient submitted the diary did not report any activities or any symptoms during the period of monitoring. Conclusion: 48 hours of cardiac monitoring consistent with normal sinus rhythm with frequent low-grade asymptomatic PVCs and no evidence of any advanced AV block.

## 2024-08-29 LAB
ALBUMIN: 4.1 G/DL
ALP BLD-CCNC: 81 U/L
ALT SERPL-CCNC: 12 U/L
ANION GAP SERPL CALCULATED.3IONS-SCNC: ABNORMAL MMOL/L
AST SERPL-CCNC: 14 U/L
BASOPHILS ABSOLUTE: ABNORMAL
BASOPHILS RELATIVE PERCENT: ABNORMAL
BILIRUB SERPL-MCNC: 0.5 MG/DL (ref 0.1–1.4)
BUN BLDV-MCNC: 15 MG/DL
CALCIUM SERPL-MCNC: 9.2 MG/DL
CHLORIDE BLD-SCNC: 102 MMOL/L
CHOLESTEROL, TOTAL: 112 MG/DL
CHOLESTEROL/HDL RATIO: NORMAL
CO2: 27 MMOL/L
CREAT SERPL-MCNC: 1.1 MG/DL
EOSINOPHILS ABSOLUTE: ABNORMAL
EOSINOPHILS RELATIVE PERCENT: ABNORMAL
GFR, ESTIMATED: ABNORMAL
GLUCOSE BLD-MCNC: 83 MG/DL
HCT VFR BLD CALC: 34.4 % (ref 41–53)
HDLC SERPL-MCNC: 50 MG/DL (ref 35–70)
HEMOGLOBIN: 10.6 G/DL (ref 13.5–17.5)
LDL CHOLESTEROL: 50
LYMPHOCYTES ABSOLUTE: ABNORMAL
LYMPHOCYTES RELATIVE PERCENT: ABNORMAL
MCH RBC QN AUTO: 25.6 PG
MCHC RBC AUTO-ENTMCNC: 30.8 G/DL
MCV RBC AUTO: 83.1 FL
MONOCYTES ABSOLUTE: ABNORMAL
MONOCYTES RELATIVE PERCENT: ABNORMAL
NEUTROPHILS ABSOLUTE: ABNORMAL
NEUTROPHILS RELATIVE PERCENT: ABNORMAL
NONHDLC SERPL-MCNC: NORMAL MG/DL
PLATELET # BLD: 256 K/ΜL
PMV BLD AUTO: 10.7 FL
POTASSIUM SERPL-SCNC: 4.9 MMOL/L
RBC # BLD: 4.14 10^6/ΜL
SODIUM BLD-SCNC: 138 MMOL/L
TOTAL PROTEIN: 6.2 G/DL (ref 6.4–8.2)
TRIGL SERPL-MCNC: 58 MG/DL
TSH SERPL DL<=0.05 MIU/L-ACNC: 0.79 UIU/ML
VLDLC SERPL CALC-MCNC: 12 MG/DL
WBC # BLD: 5.1 10^3/ML

## 2024-10-24 ENCOUNTER — LAB (OUTPATIENT)
Dept: CARDIOLOGY CLINIC | Age: 79
End: 2024-10-24
Payer: COMMERCIAL

## 2024-10-24 ENCOUNTER — OFFICE VISIT (OUTPATIENT)
Dept: CARDIOLOGY CLINIC | Age: 79
End: 2024-10-24
Payer: COMMERCIAL

## 2024-10-24 VITALS
SYSTOLIC BLOOD PRESSURE: 140 MMHG | HEART RATE: 68 BPM | WEIGHT: 171.8 LBS | BODY MASS INDEX: 25.45 KG/M2 | HEIGHT: 69 IN | DIASTOLIC BLOOD PRESSURE: 82 MMHG

## 2024-10-24 DIAGNOSIS — Z86.718 HX OF BLOOD CLOTS: ICD-10-CM

## 2024-10-24 DIAGNOSIS — I25.10 CAD S/P PERCUTANEOUS CORONARY ANGIOPLASTY: Primary | ICD-10-CM

## 2024-10-24 DIAGNOSIS — D68.59 HYPERCOAGULABLE STATE (HCC): ICD-10-CM

## 2024-10-24 DIAGNOSIS — I10 PRIMARY HYPERTENSION: Primary | ICD-10-CM

## 2024-10-24 DIAGNOSIS — I10 PRIMARY HYPERTENSION: ICD-10-CM

## 2024-10-24 DIAGNOSIS — Z98.61 CAD S/P PERCUTANEOUS CORONARY ANGIOPLASTY: Primary | ICD-10-CM

## 2024-10-24 DIAGNOSIS — I49.3 FREQUENT PVCS: ICD-10-CM

## 2024-10-24 DIAGNOSIS — E78.00 PURE HYPERCHOLESTEROLEMIA: Chronic | ICD-10-CM

## 2024-10-24 PROCEDURE — 1159F MED LIST DOCD IN RCRD: CPT | Performed by: INTERNAL MEDICINE

## 2024-10-24 PROCEDURE — 99214 OFFICE O/P EST MOD 30 MIN: CPT | Performed by: INTERNAL MEDICINE

## 2024-10-24 PROCEDURE — 3077F SYST BP >= 140 MM HG: CPT | Performed by: INTERNAL MEDICINE

## 2024-10-24 PROCEDURE — 36415 COLL VENOUS BLD VENIPUNCTURE: CPT | Performed by: INTERNAL MEDICINE

## 2024-10-24 PROCEDURE — 3079F DIAST BP 80-89 MM HG: CPT | Performed by: INTERNAL MEDICINE

## 2024-10-24 PROCEDURE — 1123F ACP DISCUSS/DSCN MKR DOCD: CPT | Performed by: INTERNAL MEDICINE

## 2024-10-24 PROCEDURE — 93000 ELECTROCARDIOGRAM COMPLETE: CPT | Performed by: INTERNAL MEDICINE

## 2024-10-24 NOTE — PROGRESS NOTES
Dariel Garcia  1945  Ez Patel MD      Chief Complaint   Patient presents with    Follow-up     Pt denies any cardiac sx. Pt denies smoking and drinking. Pt drinks 2 cups coffee daily     Chief complaint and HPI:  Dariel Garcia  is a 79 y.o. male following up for known coronary artery disease history and hypertension and hyperlipidemia.  He denies any cardiac symptoms.  Denies any palpitations chest pain shortness of breath.  He exercises 3 days a week.  Admits that he is unsteady on his feet but had not had any falls since last year office visit.  He brought his most recent labs done in August by Dr. Rabago his primary care physician.    Rest of the Cardiovascular system review is otherwise unchanged from prior encounter.  Past medical history:  has a past medical history of Anemia, Arthritis, CAD S/P percutaneous coronary angioplasty, Frequent falls, Frequent PVCs, GERD (gastroesophageal reflux disease), Vancleave filter in place, H/O cardiac catheterization, H/O echocardiogram, H/O exercise stress test, History of complete ECG, Hx of blood clots, Hx of echocardiogram, Hypercoagulable state (HCC), Hyperlipidemia, Hypertension, Pulmonary embolism (HCC), RBBB (right bundle branch block with left anterior fascicular block), Renal insufficiency, Thyroid disease, Trifascicular block, and Venous insufficiency.  Past surgical history:  has a past surgical history that includes Leg Tendon Surgery; tumor removal; Tonsillectomy; knee surgery; and Coronary angioplasty with stent (8/13/12).  Social History:   Social History     Tobacco Use    Smoking status: Former    Smokeless tobacco: Never    Tobacco comments:     reviewed 03/09/16   Substance Use Topics    Alcohol use: Not Currently     Comment: caffeine 2 cups of coffee a day, no pop     Family history: family history includes Cancer in his father; Heart Disease in his brother, maternal uncle, and mother; Stroke in his mother.  ALLERGIES:  Bee venom,

## 2024-10-24 NOTE — PATIENT INSTRUCTIONS
Please be informed that if you contact our office outside of normal business hours the physician on call cannot help with any scheduling or rescheduling issues, procedure instruction questions or any type of medication issue.    We advise you for any urgent/emergency that you go to the nearest emergency room!    PLEASE CALL OUR OFFICE DURING NORMAL BUSINESS HOURS    Monday - Friday   8 am to 5 pm    [place]: [phone]    [place]: [phone]    [place]:  [phone]  Thank you for allowing us to care for you today!   We want to ensure we can follow your treatment plan and we strive to give you the best outcomes and experience possible.   If you ever have a life threatening emergency and call 911 - for an ambulance (EMS)   Our providers can only care for you at:   [facility] or [facility].   Even if you have someone take you or you drive yourself we can only care for you in a [facility] facility. Our providers are not setup at the other healthcare locations!   **It is YOUR responsibilty to bring medication bottles and/or updated medication list to EACH APPOINTMENT. This will allow us to better serve you and all your healthcare needs**  We are committed to providing you the best care possible.    If you receive a survey after visiting one of our offices, please take time to share your experience concerning your physician office visit.  These surveys are confidential and no health information about you is shared.    We are eager to improve for you and we are counting on your feedback to help make that happen.

## 2024-10-24 NOTE — ASSESSMENT & PLAN NOTE
Patient reports no symptoms of palpitations and he says he feels that he is skipping beat only when he tries to feel his pulse otherwise does not know that he is having them.  Electrolytes were normal we will check magnesium.  TSH was also normal.  Continue current dose of carvedilol.

## 2024-10-24 NOTE — ASSESSMENT & PLAN NOTE
Today is little high however his blood pressure is usually well-controlled he says.  He was stressed out when he was coming could not find his car keys.

## 2024-10-25 LAB — MAGNESIUM SERPL-MCNC: 2.11 MG/DL (ref 1.8–2.4)

## 2024-11-22 ENCOUNTER — TELEPHONE (OUTPATIENT)
Dept: CARDIOLOGY CLINIC | Age: 79
End: 2024-11-22

## 2024-11-22 NOTE — TELEPHONE ENCOUNTER
Notified       Echo (TTE) complete     Left Ventricle: Low normal left ventricular systolic function with a visually estimated EF of 40 - 45%. Left ventricle size is normal. Mildly increased wall thickness. Septal motion is consistent with bundle branch block. Normal wall motion. Grade I diastolic dysfunction with normal LAP.    Aortic Valve: Sclerosis of the aortic valve cusps. Moderate regurgitation. AV PHT is 359.0 ms. Vena contracta measures 0.3 cm.    Mitral Valve: Mild regurgitation.    Tricuspid Valve: Physiologically normal regurgitation. The estimated RVSP is 27 mmHg.    Left Atrium: Left atrium is moderately dilated.    Interatrial Septum: Hypermobile interatrial septum.    Pericardium: No pericardial effusion.    Image quality is fair.    Compared to previous study from 2015 aortic regurgitation is now noted.